# Patient Record
Sex: FEMALE | Race: BLACK OR AFRICAN AMERICAN | NOT HISPANIC OR LATINO | Employment: STUDENT | URBAN - METROPOLITAN AREA
[De-identification: names, ages, dates, MRNs, and addresses within clinical notes are randomized per-mention and may not be internally consistent; named-entity substitution may affect disease eponyms.]

---
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
## 2023-06-13 ENCOUNTER — HOSPITAL ENCOUNTER (EMERGENCY)
Facility: HOSPITAL | Age: 18
Discharge: HOME | End: 2023-06-14
Attending: EMERGENCY MEDICINE
Payer: MEDICAID

## 2023-06-13 VITALS
OXYGEN SATURATION: 98 % | DIASTOLIC BLOOD PRESSURE: 77 MMHG | BODY MASS INDEX: 25.99 KG/M2 | TEMPERATURE: 98 F | WEIGHT: 178.56 LBS | RESPIRATION RATE: 17 BRPM | HEART RATE: 67 BPM | SYSTOLIC BLOOD PRESSURE: 132 MMHG

## 2023-06-13 DIAGNOSIS — N89.8 VAGINAL DISCHARGE: Primary | ICD-10-CM

## 2023-06-13 DIAGNOSIS — Z20.2 POSSIBLE EXPOSURE TO STD: ICD-10-CM

## 2023-06-13 LAB
B-HCG UR QL: NEGATIVE
BILIRUBIN, POC UA: NEGATIVE
BLOOD, POC UA: NEGATIVE
CLARITY, POC UA: CLEAR
COLOR, POC UA: YELLOW
CTP QC/QA: YES
GLUCOSE, POC UA: NEGATIVE
KETONES, POC UA: ABNORMAL
LEUKOCYTE EST, POC UA: NEGATIVE
NITRITE, POC UA: NEGATIVE
PH UR STRIP: 5.5 [PH]
PROTEIN, POC UA: NEGATIVE
SPECIFIC GRAVITY, POC UA: >=1.03
UROBILINOGEN, POC UA: 0.2 E.U./DL

## 2023-06-13 PROCEDURE — 99282 EMERGENCY DEPT VISIT SF MDM: CPT | Mod: ER

## 2023-06-13 PROCEDURE — 81003 URINALYSIS AUTO W/O SCOPE: CPT | Mod: ER

## 2023-06-13 PROCEDURE — 81025 URINE PREGNANCY TEST: CPT | Mod: ER | Performed by: EMERGENCY MEDICINE

## 2023-06-14 PROCEDURE — 87591 N.GONORRHOEAE DNA AMP PROB: CPT | Performed by: EMERGENCY MEDICINE

## 2023-06-14 PROCEDURE — 81514 NFCT DS BV&VAGINITIS DNA ALG: CPT | Performed by: EMERGENCY MEDICINE

## 2023-06-14 NOTE — ED PROVIDER NOTES
"Encounter Date: 6/13/2023    SCRIBE #1 NOTE: I, Jacob Quesada, am scribing for, and in the presence of,  Anderson Pace MD. I have scribed the following portions of the note - Other sections scribed: HPI,ROS,PE.     History     Chief Complaint   Patient presents with    Vaginal Discharge     Discharge and burning for a couple of weeks.      Laith Sewell is a 18 y.o. female, with no pertinent PMHx, who presents to the ED with vaginal discharge and dysuria onset "a couple of weeks". Patient reports getting back together with her boyfriend who had an STD within the last 2 months. Patient states that her boyfriend received treatment for STD but states that her current symptoms worry her for STD's which is what brought her into the ED today. No other exacerbating or alleviating factors. Denies abdominal pain or other associated symptoms.  Patient has no other complaints at the present time.    The history is provided by the patient. No  was used.   Review of patient's allergies indicates:  No Known Allergies  No past medical history on file.  No past surgical history on file.  No family history on file.  Social History     Tobacco Use    Smoking status: Never    Smokeless tobacco: Never   Substance Use Topics    Alcohol use: Yes     Comment: OCC    Drug use: Yes     Types: Marijuana     Review of Systems   Constitutional: Negative.  Negative for fever.   HENT: Negative.  Negative for sore throat.    Eyes: Negative.    Respiratory: Negative.  Negative for shortness of breath.    Cardiovascular: Negative.  Negative for chest pain.   Gastrointestinal: Negative.  Negative for abdominal pain, nausea and vomiting.   Endocrine: Negative.    Genitourinary:  Positive for dysuria and vaginal discharge.   Musculoskeletal: Negative.  Negative for myalgias.   Skin: Negative.  Negative for rash.   Allergic/Immunologic: Negative.    Neurological: Negative.  Negative for headaches.   Hematological: " Negative.  Negative for adenopathy.   Psychiatric/Behavioral: Negative.  Negative for behavioral problems.    All other systems reviewed and are negative.    Physical Exam     Initial Vitals [06/13/23 2257]   BP Pulse Resp Temp SpO2   132/77 67 17 98.1 °F (36.7 °C) 98 %      MAP       --         Physical Exam    Nursing note and vitals reviewed.  Constitutional: She appears well-developed and well-nourished.   HENT:   Head: Normocephalic and atraumatic.   Right Ear: External ear normal.   Left Ear: External ear normal.   Nose: Nose normal.   Eyes: Conjunctivae are normal.   Neck: Neck supple.   Normal range of motion.  Cardiovascular:  Normal rate, regular rhythm, normal heart sounds, intact distal pulses and normal pulses.     Exam reveals no gallop and no friction rub.       No murmur heard.  Pulmonary/Chest: Effort normal and breath sounds normal. No respiratory distress.   Abdominal: Abdomen is soft and flat. There is no abdominal tenderness.   Genitourinary:    Genitourinary Comments:  exam deferred by patient.     Musculoskeletal:         General: Normal range of motion.      Cervical back: Normal range of motion and neck supple.     Neurological: She is alert and oriented to person, place, and time.   Skin: Skin is warm and dry. Capillary refill takes less than 2 seconds.   Psychiatric: She has a normal mood and affect. Her behavior is normal.       ED Course   Procedures  Labs Reviewed   POCT URINALYSIS W/O SCOPE - Abnormal; Notable for the following components:       Result Value    Ketones, UA 3+ (*)     Spec Grav UA >=1.030 (*)     All other components within normal limits   C. TRACHOMATIS/N. GONORRHOEAE BY AMP DNA   VAGINOSIS SCREEN BY DNA PROBE   POCT URINE PREGNANCY   POCT URINALYSIS W/O SCOPE          Imaging Results    None          Medications - No data to display  Medical Decision Making:   History:   Old Medical Records: I decided to obtain old medical records.  Clinical Tests:   Lab Tests:  Ordered and Reviewed  ED Management:  This patient presents with a vaginal discharge.  Patient deferred  evaluation/physical examination.  Patient has questionable exposure to her boyfriend with an STD.  There is some confusion as to her re exposure.  Patient had swabs obtained and urine obtained and sent off.  Patient again has no abdominal pain.  Patient has no barriers to care at home and will be notified appropriately.        Scribe Attestation:   Scribe #1: I performed the above scribed service and the documentation accurately describes the services I performed. I attest to the accuracy of the note.            This document was produced by a scribe under my direction and in my presence. I agree with the content of the note and have made any necessary edits.     Anderson Pace MD    06/14/2023 6:48 AM         Clinical Impression:   Final diagnoses:  [N89.8] Vaginal discharge (Primary)  [Z20.2] Possible exposure to STD        ED Disposition Condition    Discharge Stable          ED Prescriptions    None       Follow-up Information       Follow up With Specialties Details Why Contact Info    Petra Spaulding MD Pediatrics Schedule an appointment as soon as possible for a visit in 1 week  [address] [place] [address]  [phone]               Anderson Pace MD  06/14/23 0629

## 2023-06-15 LAB
C TRACH DNA SPEC QL NAA+PROBE: DETECTED
N GONORRHOEA DNA SPEC QL NAA+PROBE: NOT DETECTED

## 2023-06-16 LAB
BACTERIAL VAGINOSIS DNA: POSITIVE
CANDIDA GLABRATA DNA: NEGATIVE
CANDIDA KRUSEI DNA: NEGATIVE
CANDIDA RRNA VAG QL PROBE: NEGATIVE
T VAGINALIS RRNA GENITAL QL PROBE: NEGATIVE

## 2023-06-18 ENCOUNTER — TELEPHONE (OUTPATIENT)
Dept: EMERGENCY MEDICINE | Facility: HOSPITAL | Age: 18
End: 2023-06-18
Payer: MEDICAID

## 2023-06-18 RX ORDER — METRONIDAZOLE 500 MG/1
500 TABLET ORAL 2 TIMES DAILY
Qty: 14 TABLET | Refills: 0 | Status: SHIPPED | OUTPATIENT
Start: 2023-06-18 | End: 2023-06-25

## 2023-06-18 RX ORDER — DOXYCYCLINE 100 MG/1
100 CAPSULE ORAL 2 TIMES DAILY
Qty: 14 CAPSULE | Refills: 0 | Status: SHIPPED | OUTPATIENT
Start: 2023-06-18 | End: 2023-06-25

## 2023-07-23 ENCOUNTER — HOSPITAL ENCOUNTER (EMERGENCY)
Facility: HOSPITAL | Age: 18
Discharge: HOME | End: 2023-07-23
Attending: EMERGENCY MEDICINE
Payer: MEDICAID

## 2023-07-23 VITALS
OXYGEN SATURATION: 99 % | WEIGHT: 179.25 LBS | RESPIRATION RATE: 18 BRPM | DIASTOLIC BLOOD PRESSURE: 75 MMHG | TEMPERATURE: 98 F | SYSTOLIC BLOOD PRESSURE: 115 MMHG | HEIGHT: 70 IN | HEART RATE: 64 BPM | BODY MASS INDEX: 25.66 KG/M2

## 2023-07-23 DIAGNOSIS — L02.411 ABSCESS OF RIGHT AXILLA: Primary | ICD-10-CM

## 2023-07-23 LAB
B-HCG UR QL: NEGATIVE
CTP QC/QA: YES

## 2023-07-23 PROCEDURE — 99284 EMERGENCY DEPT VISIT MOD MDM: CPT | Mod: 25,ER

## 2023-07-23 PROCEDURE — 10060 I&D ABSCESS SIMPLE/SINGLE: CPT | Mod: ER

## 2023-07-23 PROCEDURE — 81025 URINE PREGNANCY TEST: CPT | Mod: ER

## 2023-07-23 PROCEDURE — 81025 URINE PREGNANCY TEST: CPT | Mod: ER | Performed by: EMERGENCY MEDICINE

## 2023-07-23 PROCEDURE — 25000003 PHARM REV CODE 250: Mod: ER | Performed by: NURSE PRACTITIONER

## 2023-07-23 RX ORDER — IBUPROFEN 600 MG/1
600 TABLET ORAL EVERY 6 HOURS PRN
Qty: 20 TABLET | Refills: 0 | Status: SHIPPED | OUTPATIENT
Start: 2023-07-23

## 2023-07-23 RX ORDER — CLINDAMYCIN HYDROCHLORIDE 150 MG/1
300 CAPSULE ORAL
Status: COMPLETED | OUTPATIENT
Start: 2023-07-23 | End: 2023-07-23

## 2023-07-23 RX ORDER — LIDOCAINE HYDROCHLORIDE 10 MG/ML
10 INJECTION INFILTRATION; PERINEURAL
Status: COMPLETED | OUTPATIENT
Start: 2023-07-23 | End: 2023-07-23

## 2023-07-23 RX ORDER — CLINDAMYCIN HYDROCHLORIDE 150 MG/1
300 CAPSULE ORAL 4 TIMES DAILY
Qty: 56 CAPSULE | Refills: 0 | Status: SHIPPED | OUTPATIENT
Start: 2023-07-23 | End: 2023-07-30

## 2023-07-23 RX ORDER — MUPIROCIN 20 MG/G
OINTMENT TOPICAL 3 TIMES DAILY
Qty: 15 G | Refills: 0 | Status: SHIPPED | OUTPATIENT
Start: 2023-07-23

## 2023-07-23 RX ORDER — IBUPROFEN 600 MG/1
600 TABLET ORAL
Status: COMPLETED | OUTPATIENT
Start: 2023-07-23 | End: 2023-07-23

## 2023-07-23 RX ADMIN — LIDOCAINE HYDROCHLORIDE 10 ML: 10 INJECTION, SOLUTION INFILTRATION; PERINEURAL at 10:07

## 2023-07-23 RX ADMIN — CLINDAMYCIN HYDROCHLORIDE 300 MG: 150 CAPSULE ORAL at 10:07

## 2023-07-23 RX ADMIN — IBUPROFEN 600 MG: 600 TABLET ORAL at 10:07

## 2023-07-23 NOTE — Clinical Note
"Laith Davies" Melodie was seen and treated in our emergency department on 7/23/2023.  She may return to work on 07/25/2023.       If you have any questions or concerns, please don't hesitate to call.      Linda Wahl NP"

## 2023-07-23 NOTE — ED PROVIDER NOTES
Encounter Date: 7/23/2023    SCRIBE #1 NOTE: I, Malorie Chacon, am scribing for, and in the presence of,  Linda Wahl NP. I have scribed the following portions of the note - Other sections scribed: HPI, ROS.     History     Chief Complaint   Patient presents with    Abscess     Right axillary abscess x3 days; hx of this happening prior per pt. Afebrile.      CC:  Abscess    HPI:  This is a 18-year-old female presenting to the ED for evaluation of right axillary abscess x3 days. Patient reports she has had an abscess before, but never this severe. NKDA    The history is provided by the patient. No  was used.   Review of patient's allergies indicates:  No Known Allergies  No past medical history on file.  No past surgical history on file.  No family history on file.  Social History     Tobacco Use    Smoking status: Never    Smokeless tobacco: Never   Substance Use Topics    Alcohol use: Yes     Comment: OCC    Drug use: Yes     Types: Marijuana     Review of Systems   Constitutional:  Negative for chills and fever.   HENT:  Negative for sore throat.    Respiratory:  Negative for shortness of breath.    Cardiovascular:  Negative for chest pain.   Gastrointestinal:  Negative for nausea.   Genitourinary:  Negative for dysuria.   Musculoskeletal:  Negative for back pain.   Skin:  Negative for rash.        abscess   Neurological:  Negative for weakness.   Hematological:  Does not bruise/bleed easily.     Physical Exam     Initial Vitals [07/23/23 0940]   BP Pulse Resp Temp SpO2   115/75 64 18 98.4 °F (36.9 °C) 99 %      MAP       --         Physical Exam    Constitutional: She appears well-developed and well-nourished. She is not diaphoretic. No distress.   HENT:   Head: Normocephalic and atraumatic.   Neck:   Normal range of motion.  Cardiovascular:  Normal rate, regular rhythm, normal heart sounds and intact distal pulses.           Pulmonary/Chest: No respiratory distress.    Musculoskeletal:         General: Normal range of motion.      Cervical back: Normal range of motion.     Neurological: She is alert and oriented to person, place, and time.   Skin: Skin is warm and dry. Abscess noted.   3 cm fluctuance to right axilla with overlying erythema   Psychiatric: She has a normal mood and affect. Her behavior is normal.       ED Course   I & D - Incision and Drainage    Date/Time: 7/23/2023 11:15 AM  Location procedure was performed: [facility] EMERGENCY DEPARTMENT  Performed by: Linda Wahl NP  Authorized by: Lorenza Easley DO   Type: abscess  Location: right axilla.  Anesthesia: local infiltration    Anesthesia:  Local Anesthetic: lidocaine 1% without epinephrine  Anesthetic total: 6 mL  Scalpel size: 10  Incision type: single straight  Complexity: simple  Drainage: pus, bloody and purulent  Drainage amount: copious  Wound treatment: incision, drainage, expression of material, deloculation and wound packed  Packing material: 1/4 in gauze  Patient tolerance: Patient tolerated the procedure well with no immediate complications      Labs Reviewed   POCT URINE PREGNANCY          Imaging Results    None          Medications   LIDOcaine HCL 10 mg/ml (1%) injection 10 mL (10 mLs Infiltration Given 7/23/23 1042)   clindamycin capsule 300 mg (300 mg Oral Given 7/23/23 1042)   ibuprofen tablet 600 mg (600 mg Oral Given 7/23/23 1042)     Medical Decision Making:   History:   Old Medical Records: I decided to obtain old medical records.  Clinical Tests:   Lab Tests: Ordered and Reviewed  ED Management:  This is an evaluation of a 18 y.o. female that presents to the Emergency Department for an abscess. Physical Exam shows a non-toxic, afebrile, and well appearing female. There is a 3 cm x 3 cm abscess to the right axilla with a central area of fluctuance with surrounding induration and erythema.  There is not active drainage. FROM right shoulder.    Vital Signs Are Reassuring.  Procedure: Abscess was drained per the procedure note. The wound was packed.      My overall impression is Abscess. I considered, but at this time, do not suspect an extensive cellulitis, sepsis, or bacteremia to warrant admission.    D/C Information: warm compresses 10-15 minutes, 4-5 times a day to help increase wound drainage and decrease swelling. The diagnosis, treatment plan, instructions for follow-up and reevaluation with her PCP or the ED in 2 - 3 days for wound recheck as well as ED return precautions were discussed and understanding was verbalized. All questions or concerns have been addressed.         Scribe Attestation:   Scribe #1: I performed the above scribed service and the documentation accurately describes the services I performed. I attest to the accuracy of the note.                 I, CARLOS Wahl, personally performed the services described in this documentation.  All medical record entries made by the scribe were at my direction and in my presence.  I have reviewed the chart and agree that the record reflects my personal performance and is accurate and complete.   Clinical Impression:   Final diagnoses:  [L02.411] Abscess of right axilla (Primary)        ED Disposition Condition    Discharge Stable          ED Prescriptions       Medication Sig Dispense Start Date End Date Auth. Provider    clindamycin (CLEOCIN) 150 MG capsule Take 2 capsules (300 mg total) by mouth 4 (four) times daily. for 7 days 56 capsule 7/23/2023 7/30/2023 Linda Wahl NP    mupirocin (BACTROBAN) 2 % ointment Apply topically 3 (three) times daily. 15 g 7/23/2023 -- Linda Wahl NP    ibuprofen (ADVIL,MOTRIN) 600 MG tablet Take 1 tablet (600 mg total) by mouth every 6 (six) hours as needed for Pain. 20 tablet 7/23/2023 -- Linda Wahl NP          Follow-up Information    None          Linda Wahl NP  07/23/23 2437

## 2024-01-04 ENCOUNTER — HOSPITAL ENCOUNTER (EMERGENCY)
Facility: HOSPITAL | Age: 19
Discharge: HOME | End: 2024-01-04
Attending: EMERGENCY MEDICINE
Payer: MEDICAID

## 2024-01-04 VITALS
OXYGEN SATURATION: 100 % | HEART RATE: 71 BPM | SYSTOLIC BLOOD PRESSURE: 112 MMHG | HEIGHT: 70 IN | WEIGHT: 170 LBS | TEMPERATURE: 99 F | DIASTOLIC BLOOD PRESSURE: 71 MMHG | BODY MASS INDEX: 24.34 KG/M2 | RESPIRATION RATE: 14 BRPM

## 2024-01-04 DIAGNOSIS — J06.9 UPPER RESPIRATORY TRACT INFECTION, UNSPECIFIED TYPE: Primary | ICD-10-CM

## 2024-01-04 LAB
B-HCG UR QL: NEGATIVE
CTP QC/QA: YES
CTP QC/QA: YES
INFLUENZA A ANTIGEN, POC: NEGATIVE
INFLUENZA B ANTIGEN, POC: NEGATIVE
SARS-COV-2 RDRP RESP QL NAA+PROBE: NEGATIVE

## 2024-01-04 PROCEDURE — 87804 INFLUENZA ASSAY W/OPTIC: CPT | Mod: ER

## 2024-01-04 PROCEDURE — 99284 EMERGENCY DEPT VISIT MOD MDM: CPT | Mod: ER

## 2024-01-04 PROCEDURE — 87635 SARS-COV-2 COVID-19 AMP PRB: CPT | Mod: ER | Performed by: NURSE PRACTITIONER

## 2024-01-04 PROCEDURE — 81025 URINE PREGNANCY TEST: CPT | Mod: ER | Performed by: EMERGENCY MEDICINE

## 2024-01-04 RX ORDER — ACETAMINOPHEN 500 MG
1000 TABLET ORAL EVERY 6 HOURS PRN
Qty: 20 TABLET | Refills: 0 | Status: SHIPPED | OUTPATIENT
Start: 2024-01-04

## 2024-01-04 RX ORDER — FLUTICASONE PROPIONATE 50 MCG
1 SPRAY, SUSPENSION (ML) NASAL 2 TIMES DAILY PRN
Qty: 15 G | Refills: 0 | Status: SHIPPED | OUTPATIENT
Start: 2024-01-04

## 2024-01-04 RX ORDER — GUAIFENESIN 600 MG/1
1200 TABLET, EXTENDED RELEASE ORAL 2 TIMES DAILY PRN
Qty: 40 TABLET | Refills: 0 | Status: SHIPPED | OUTPATIENT
Start: 2024-01-04 | End: 2024-01-14

## 2024-01-04 RX ORDER — CETIRIZINE HYDROCHLORIDE 10 MG/1
10 TABLET ORAL DAILY
Qty: 30 TABLET | Refills: 0 | Status: SHIPPED | OUTPATIENT
Start: 2024-01-04 | End: 2025-01-03

## 2024-01-04 NOTE — DISCHARGE INSTRUCTIONS
COVID and FLU are negative.    Thank you for coming to our Emergency Department today. It is important to remember that some problems or medical conditions are difficult to diagnose and may not be found during your Emergency Department visit.     Be sure to follow up with your primary care doctor and review all labs/imaging/tests that were performed during your ER visit with them. Some labs/tests may be outside of the normal range and require non-emergent follow-up and further investigation to help diagnose/exclude/prevent complications or other potentially serious medical conditions that were not addressed during your ER visit.    If you do not have a primary care doctor, you may contact the one listed on your discharge paperwork or you may also call the [facility] Appointment Desk at [phone] to schedule an appointment and establish care with one. It is important to your health that you have a primary care doctor.    Please take all medications as directed. All medications may potentially have side-effects and it is impossible to predict which medications may give you side-effects or what side-effects (if any) they will give you.. If you feel that you are having a negative effect or side-effect of any medication you should immediately stop taking them and seek medical attention. If you feel that you are having a life-threatening reaction call 911.    Return to the ER with any questions/concerns, new/concerning symptoms, worsening or failure to improve.     Do not drive, swim, climb to height, take a bath, operate heavy machinery, drink alcohol or take potentially sedating medications, sign any legal documents or make any important decisions for 24 hours if you have received any pain medications, sedatives or mood altering drugs during your ER visit or within 24 hours of taking them if they have been prescribed to you.     You can find additional resources for Dentists, hearing aids, durable medical  equipment, low cost pharmacies and other resources at [email or web address]    BELOW THIS LINE ONLY APPLIES IF YOU HAVE A COVID TEST PENDING OR IF YOU HAVE BEEN DIAGNOSED WITH COVID:  Please access [facility] to review the results of your test. Until the results of your COVID test return, you should isolate yourself so as not to potentially spread illness to others.   If your COVID test returns positive, you should isolate yourself so as not to spread illness to others. After five full days, if you are feeling better and you have not had fever for 24 hours, you can return to your typical daily activities, but you must wear a mask around others for an additional 5 days.   If your COVID test returns negative and you are either unvaccinated or more than six months out from your two-dose vaccine and are not yet boosted, you should still quarantine for 5 full days followed by strict mask use for an additional 5 full days.   If your COVID test returns negative and you have received your 2-dose initial vaccine as well as a booster, you should continue strict mask use for 10 full days after the exposure.  For all those exposed, best practice includes a test at day 5 after the exposure. This can be a home test or a test through one of the many testing centers throughout our community.   Masking is always advised to limit the spread of COVID. Cdc.gov is an excellent site to obtain the latest up to date recommendations regarding COVID and COVID testing.     CDC Testing and Quarantine Guidelines for patients with exposure to a known-positive COVID-19 person:  A close exposure is defined as anyone who has had an exposure (masked or unmasked) to a known COVID -19 positive person within 6 feet of someone for a cumulative total of 15 minutes or more over a 24-hour period.   Vaccinated and/or if you recently had a positive covid test within 90 days do NOT need to quarantine after contact with someone who had COVID-19 unless you  develop symptoms.   Fully vaccinated people who have not had a positive test within 90 days, should get tested 3-5 days after their exposure, even if they don't have symptoms and wear a mask indoors in public for 14 days following exposure or until their test result is negative.      Unvaccinated and/or NOT had a positive test within 90 days and meet close exposure  You are required by CDC guidelines to quarantine for at least 5 days from time of exposure followed by 5 days of strict masking. It is recommended, but not required to test after 5 days, unless you develop symptoms, in which case you should test at that time.  If you get tested after 5 days and your test is positive, your 5 day period of isolation starts the day of the positive test.    If your exposure does not meet the above definition, you can return to your normal daily activities to include social distancing, wearing a mask and frequent handwashing.      Here is a link to guidance from the CDC:  https://www.cdc.gov/media/releases/2021/s1227-isolation-quarantine-guidance.html      [place] Dept Of Health Testing Sites:  https://ldh.la.gov/page/3934      [facility] website with testing locations and guidance:  [email or web address]

## 2024-01-04 NOTE — ED PROVIDER NOTES
Encounter Date: 1/4/2024       History     Chief Complaint   Patient presents with    Possible Pregnancy    COVID-19 Concerns     A 17 y/o female presents to the ER with pregnancy and Covid concerns requesting testing. VSS and Afebrile. Pt also reporting a cough x 2 weeks.      CC: URI    HPI:  This is a 18-year-old female presenting to the ED for evaluation of headache, congestion, loss of taste and smell.  Symptoms began 4 days ago.  She reports a persistent cough for the past few weeks.  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  She is also requesting a pregnancy test.  LMP mid December.    The history is provided by the patient. No  was used.     Review of patient's allergies indicates:  No Known Allergies  No past medical history on file.  No past surgical history on file.  No family history on file.  Social History     Tobacco Use    Smoking status: Never    Smokeless tobacco: Never   Substance Use Topics    Alcohol use: Yes     Comment: OCC    Drug use: Yes     Types: Marijuana     Review of Systems   Constitutional:  Negative for chills and fever.   HENT:  Positive for congestion. Negative for sore throat and voice change.    Respiratory:  Positive for cough. Negative for shortness of breath.    Cardiovascular:  Negative for chest pain.   Gastrointestinal:  Negative for nausea.   Genitourinary:  Negative for dysuria.   Musculoskeletal:  Negative for back pain.   Skin:  Negative for rash.   Neurological:  Negative for weakness.   Hematological:  Does not bruise/bleed easily.       Physical Exam     Initial Vitals [01/04/24 1449]   BP Pulse Resp Temp SpO2   106/69 78 18 98.7 °F (37.1 °C) 100 %      MAP       --         Physical Exam    Constitutional: She appears well-developed and well-nourished. She is not diaphoretic. No distress.   HENT:   Head: Normocephalic and atraumatic.   Neck:   Normal range of motion.  Cardiovascular:  Normal rate, regular rhythm, normal  heart sounds and intact distal pulses.           Pulmonary/Chest: No respiratory distress.   Musculoskeletal:         General: Normal range of motion.      Cervical back: Normal range of motion.     Neurological: She is alert and oriented to person, place, and time.   Skin: Skin is warm and dry.   Psychiatric: She has a normal mood and affect. Her behavior is normal.         ED Course   Procedures  Labs Reviewed   POCT URINE PREGNANCY   SARS-COV-2 RDRP GENE    Narrative:     This test utilizes isothermal nucleic acid amplification technology to detect the SARS-CoV-2 RdRp nucleic acid segment. The analytical sensitivity (limit of detection) is 500 copies/swab.     A POSITIVE result is indicative of the presence of SARS-CoV-2 RNA; clinical correlation with patient history and other diagnostic information is necessary to determine patient infection status.    A NEGATIVE result means that SARS-CoV-2 nucleic acids are not present above the limit of detection. A NEGATIVE result should be treated as presumptive. It does not rule out the possibility of COVID-19 and should not be the sole basis for treatment decisions. If COVID-19 is strongly suspected based on clinical and exposure history, re-testing using an alternate molecular assay should be considered.     This test is only for use under the Food and Drug Administration s Emergency Use Authorization (EUA).     Commercial kits are provided by [employer]. Performance characteristics of the EUA have been independently verified by [facility] Department of Pathology and Laboratory Medicine.   _________________________________________________________________   The authorized Fact Sheet for Healthcare Providers and the authorized Fact Sheet for Patients of the ID NOW COVID-19 are available on the FDA website:    https://www.fda.gov/media/864346/download      https://www.fda.gov/media/413024/download      POCT INFLUENZA A/B MOLECULAR   POCT RAPID INFLUENZA  A/B          Imaging Results    None          Medications - No data to display  Medical Decision Making  This is an evaluation of a 18 y.o. female that presents to the Emergency Department for URI symptoms. The patient is a non-toxic, afebrile, and well appearing female. On physical exam ears and pharynx are without evidence of infection. Appears well hydrated with moist mucus membranes. Neck soft and supple with no meningeal signs or cervical lymphadenopathy. Breath sounds are clear and equal bilaterally with no adventitious breath sounds, tachypnea or respiratory distress with room air pulse ox of 100% and no evidence of hypoxia.     Vital Signs Are Reassuring.  RESULTS:   UPT negative.  COVID and flu negative.    My overall impression is Viral URI. I considered, but at this time, do not suspect OM, OE, strep pharyngitis, meningitis, pneumonia, or acute bacterial sinusitis.    ED return precautions were discussed and understanding was verbalized. All questions or concerns have been addressed.     Amount and/or Complexity of Data Reviewed  Labs: ordered.    Risk  OTC drugs.                                      Clinical Impression:  Final diagnoses:  [J06.9] Upper respiratory tract infection, unspecified type (Primary)          ED Disposition Condition    Discharge Stable          ED Prescriptions       Medication Sig Dispense Start Date End Date Auth. Provider    guaiFENesin (MUCINEX) 600 mg 12 hr tablet Take 2 tablets (1,200 mg total) by mouth 2 (two) times daily as needed for Congestion. 40 tablet 1/4/2024 1/14/2024 Linda Wahl NP    fluticasone propionate (FLONASE) 50 mcg/actuation nasal spray 1 spray (50 mcg total) by Each Nostril route 2 (two) times daily as needed for Rhinitis or Allergies. 15 g 1/4/2024 -- Linda Wahl NP    cetirizine (ZYRTEC) 10 MG tablet Take 1 tablet (10 mg total) by mouth once daily. 30 tablet 1/4/2024 1/3/2025 Linda Wahl NP    acetaminophen (TYLENOL) 500 MG tablet  Take 2 tablets (1,000 mg total) by mouth every 6 (six) hours as needed for Pain. 20 tablet 1/4/2024 -- Linda Wahl NP          Follow-up Information       Follow up With Specialties Details Why Contact Info    Petra Spaulding MD Pediatrics Schedule an appointment as soon as possible for a visit  For follow-up [address] [address]  [phone]      [facility] ED Emergency Medicine Go to  If symptoms worsen [address] [facility] [address] [phone]             Linda Wahl NP  01/04/24 5369

## 2024-08-24 ENCOUNTER — HOSPITAL ENCOUNTER (EMERGENCY)
Facility: HOSPITAL | Age: 19
Discharge: HOME | End: 2024-08-24
Attending: EMERGENCY MEDICINE
Payer: MEDICAID

## 2024-08-24 VITALS
HEART RATE: 69 BPM | RESPIRATION RATE: 19 BRPM | BODY MASS INDEX: 25.4 KG/M2 | DIASTOLIC BLOOD PRESSURE: 68 MMHG | OXYGEN SATURATION: 100 % | SYSTOLIC BLOOD PRESSURE: 124 MMHG | HEIGHT: 69 IN | WEIGHT: 171.5 LBS | TEMPERATURE: 98 F

## 2024-08-24 DIAGNOSIS — N30.00 ACUTE CYSTITIS WITHOUT HEMATURIA: Primary | ICD-10-CM

## 2024-08-24 LAB
B-HCG UR QL: NEGATIVE
BILIRUBIN, POC UA: NEGATIVE
BLOOD, POC UA: NEGATIVE
CLARITY, UA POC: CLEAR
COLOR, UA POC: YELLOW
CTP QC/QA: YES
GLUCOSE, POC UA: NEGATIVE
KETONES, POC UA: NEGATIVE
LEUKOCYTE EST, POC UA: ABNORMAL
NITRITE, POC UA: POSITIVE
PH UR STRIP: 6 [PH]
PROTEIN, POC UA: NEGATIVE
SPECIFIC GRAVITY, POC UA: 1.02
UROBILINOGEN, POC UA: 0.2 E.U./DL

## 2024-08-24 PROCEDURE — 81025 URINE PREGNANCY TEST: CPT | Mod: ER

## 2024-08-24 PROCEDURE — 81514 NFCT DS BV&VAGINITIS DNA ALG: CPT

## 2024-08-24 PROCEDURE — 87491 CHLMYD TRACH DNA AMP PROBE: CPT

## 2024-08-24 PROCEDURE — 87591 N.GONORRHOEAE DNA AMP PROB: CPT

## 2024-08-24 PROCEDURE — 99283 EMERGENCY DEPT VISIT LOW MDM: CPT | Mod: 25,ER

## 2024-08-24 RX ORDER — NITROFURANTOIN 25; 75 MG/1; MG/1
100 CAPSULE ORAL 2 TIMES DAILY
Qty: 10 CAPSULE | Refills: 0 | Status: SHIPPED | OUTPATIENT
Start: 2024-08-24 | End: 2024-08-29

## 2024-08-24 NOTE — DISCHARGE INSTRUCTIONS
You were seen in the emergency department today for UTI.  Please take all medications as prescribed and as we discussed.  Follow-up with specialist if instructed to do so.  It is important to remember that some problems are difficult to diagnose and may not be found during your Emergency Department visit. Be sure to follow up with your primary care doctor and review all labs/imaging/tests that were performed during this visit with them. Some labs/tests may be outside of the normal range and require non-emergent follow-up and further investigation to help diagnose/exclude/prevent complications or other medical conditions. Return to the emergency department for any new or worsening symptoms. Thank you for allowing me to care for you today, it was my pleasure. I hope you get to feeling better soon!

## 2024-08-24 NOTE — ED PROVIDER NOTES
"Encounter Date: 8/24/2024       History     Chief Complaint   Patient presents with    Exposure to STD     Requesting to be "checked out." Denies vaginal discharge, denies dysuria     Patient is a 19-year-old female with no past medical history presents to the emergency department for STI testing.  She states she is currently trying to conceive, and is wanting to make sure she does not have anything that could be causing difficulty with becoming pregnant.  She denies dysuria, urinary frequency, hematuria, flank pain.  Denies vaginal bleeding, vaginal discharge.  Denies abdominal pain.  No fever, vomiting.  No symptoms.  She is also requesting assistance with a obtaining an OBGYN.    The history is provided by the patient.     Review of patient's allergies indicates:  No Known Allergies  History reviewed. No pertinent past medical history.  History reviewed. No pertinent surgical history.  No family history on file.  Social History     Tobacco Use    Smoking status: Never    Smokeless tobacco: Never   Substance Use Topics    Alcohol use: Yes     Comment: OCC    Drug use: Yes     Types: Marijuana     Review of Systems   Constitutional:  Negative for chills and fever.   Respiratory:  Negative for shortness of breath.    Cardiovascular:  Negative for chest pain.   Gastrointestinal:  Negative for abdominal pain, nausea and vomiting.   Genitourinary:  Negative for dysuria, flank pain, frequency, hematuria, menstrual problem, pelvic pain, vaginal bleeding and vaginal discharge.   Musculoskeletal:  Negative for back pain, neck pain and neck stiffness.   Neurological:  Negative for headaches.       Physical Exam     Initial Vitals [08/24/24 1702]   BP Pulse Resp Temp SpO2   120/68 67 18 98.1 °F (36.7 °C) 100 %      MAP       --         Physical Exam    Nursing note and vitals reviewed.  Constitutional: She appears well-developed and well-nourished.   HENT:   Head: Normocephalic and atraumatic.   Right Ear: External ear " normal.   Left Ear: External ear normal.   Neck: Carotid bruit is not present.   Normal range of motion.  Cardiovascular:  Normal rate, regular rhythm, normal heart sounds and intact distal pulses.     Exam reveals no gallop and no friction rub.       No murmur heard.  Pulmonary/Chest: Breath sounds normal. No respiratory distress. She has no wheezes. She has no rhonchi. She has no rales.   Abdominal: Abdomen is soft. Bowel sounds are normal. She exhibits no distension. There is no abdominal tenderness. There is no rebound and no guarding.   Musculoskeletal:         General: Normal range of motion.      Cervical back: Normal range of motion.     Neurological: She is alert and oriented to person, place, and time. GCS score is 15. GCS eye subscore is 4. GCS verbal subscore is 5. GCS motor subscore is 6.   Psychiatric: She has a normal mood and affect.         ED Course   Procedures  Labs Reviewed   POCT URINALYSIS W/O SCOPE - Abnormal       Result Value    Glucose, UA Negative      Bilirubin, UA Negative      Ketones, UA Negative      Spec Grav UA 1.020      Blood, UA Negative      PH, UA 6.0      Protein, UA Negative      Urobilinogen, UA 0.2      Nitrite, UA Positive (*)     Leukocytes, UA Trace (*)     Color, UA POC Yellow      Clarity, UA, POC Clear     C. TRACHOMATIS/N. GONORRHOEAE BY AMP DNA   VAGINOSIS SCREEN BY DNA PROBE   POCT URINE PREGNANCY    POC Preg Test, Ur Negative       Acceptable Yes     POCT URINALYSIS W/O SCOPE          Imaging Results    None          Medications - No data to display  Medical Decision Making  This is an emergent evaluation of a 19-year-old female who presents for STI testing.  No symptoms.    Patient looks well clinically. Regular rate rhythm without murmurs.  No carotid bruits appreciated on exam. Lungs are clear to auscultation bilaterally.  Abdomen is soft, nontender, non distended, with normal bowel sounds.     Differential diagnosis includes but is not limited  to UTI, gonorrhea, chlamydia.  Considered but doubt PID/TOA.    Workup initiated with urinalysis, UPT, vaginal screen, GC.  Vital signs, chart, labs, and/or imaging were all reviewed.  See ED course below and interpretations above.  Will await test results prior to treating for GC given no symptoms.  Will discharge home with Macrobid for UTI, OBGYN follow-up.  Ambulatory referral placed. Patient is very well appearing, and in no acute distress. Vital signs are reassuring here in the emergency department, patient is afebrile, breathing comfortable, satting 100 % on room air. Patient/Caregiver is stable for discharge at this time.  Patient/Caregiver was informed of results and plan of care. Patient/Caregiver verbalized understanding of care plan. All questions and concerns were addressed. Discussed strict return precautions with the patient/caregiver. Instructed follow up with primary care provider within 1 week.      Darryl Hughes PA-C    DISCLAIMER: This note was prepared with [employer] voice recognition transcription software. Garbled syntax, mangled pronouns, and other bizarre constructions may be attributed to that software system.       Amount and/or Complexity of Data Reviewed  Labs: ordered. Decision-making details documented in ED Course.    Risk  Prescription drug management.               ED Course as of 08/24/24 2046   Sat Aug 24, 2024   1732 POCT URINALYSIS W/O SCOPE(!)  Urinalysis showing trace leukocytes, positive nitrite. No blood. [TM]   1754 POCT urine pregnancy  UPT negative [TM]   1754 Will discharge home with Macrobid for UTI [TM]   1754 We will hold off on empiric treatment for STIs [TM]      ED Course User Index  [TM] Darryl Hughes PA-C                           Clinical Impression:  Final diagnoses:  [N30.00] Acute cystitis without hematuria (Primary)          ED Disposition Condition    Discharge Stable          ED Prescriptions       Medication Sig Dispense Start Date End Date Auth.  Provider    nitrofurantoin, macrocrystal-monohydrate, (MACROBID) 100 MG capsule Take 1 capsule (100 mg total) by mouth 2 (two) times daily. for 5 days 10 capsule 8/24/2024 8/29/2024 Darryl Hughes PA-C          Follow-up Information       Follow up With Specialties Details Why Contact Info    [facility] ED Emergency Medicine Go to  As needed, If symptoms worsen, or new symptoms develop [address] [facility] [address] [phone]    Primary care doctor  Schedule an appointment as soon as possible for a visit in 3 days               Darryl Hughes PA-C  08/24/24 3574

## 2024-08-26 LAB
C TRACH DNA SPEC QL NAA+PROBE: NOT DETECTED
N GONORRHOEA DNA SPEC QL NAA+PROBE: NOT DETECTED

## 2024-08-27 LAB
BACTERIAL VAGINOSIS DNA: NORMAL
CANDIDA GLABRATA DNA: NORMAL
CANDIDA KRUSEI DNA: NORMAL
CANDIDA RRNA VAG QL PROBE: NORMAL
T VAGINALIS RRNA GENITAL QL PROBE: NORMAL

## 2024-12-20 ENCOUNTER — HOSPITAL ENCOUNTER (EMERGENCY)
Facility: HOSPITAL | Age: 19
Discharge: HOME | End: 2024-12-20
Attending: STUDENT IN AN ORGANIZED HEALTH CARE EDUCATION/TRAINING PROGRAM
Payer: MEDICAID

## 2024-12-20 VITALS
DIASTOLIC BLOOD PRESSURE: 79 MMHG | BODY MASS INDEX: 25.84 KG/M2 | RESPIRATION RATE: 18 BRPM | OXYGEN SATURATION: 99 % | SYSTOLIC BLOOD PRESSURE: 148 MMHG | TEMPERATURE: 98 F | HEART RATE: 74 BPM | WEIGHT: 175 LBS

## 2024-12-20 DIAGNOSIS — N39.0 URINARY TRACT INFECTION WITH HEMATURIA, SITE UNSPECIFIED: ICD-10-CM

## 2024-12-20 DIAGNOSIS — J10.1 INFLUENZA A: Primary | ICD-10-CM

## 2024-12-20 DIAGNOSIS — R31.9 URINARY TRACT INFECTION WITH HEMATURIA, SITE UNSPECIFIED: ICD-10-CM

## 2024-12-20 LAB
B-HCG UR QL: NEGATIVE
BILIRUBIN, POC UA: NEGATIVE
BLOOD, POC UA: ABNORMAL
CLARITY, UA: CLEAR
COLOR, UA: YELLOW
CTP QC/QA: YES
CTP QC/QA: YES
GLUCOSE, POC UA: NEGATIVE
INFLUENZA A ANTIGEN, POC: POSITIVE
INFLUENZA B ANTIGEN, POC: NEGATIVE
KETONES, POC UA: ABNORMAL
LEUKOCYTE EST, POC UA: ABNORMAL
NITRITE, POC UA: POSITIVE
PH UR STRIP: 6 [PH] (ref 5–8)
POC RAPID STREP A: NEGATIVE
PROTEIN, POC UA: ABNORMAL
SARS-COV-2 RDRP RESP QL NAA+PROBE: NEGATIVE
SPECIFIC GRAVITY, POC UA: 1.02 (ref 1–1.03)
UROBILINOGEN, POC UA: 0.2 E.U./DL

## 2024-12-20 PROCEDURE — 87088 URINE BACTERIA CULTURE: CPT | Performed by: STUDENT IN AN ORGANIZED HEALTH CARE EDUCATION/TRAINING PROGRAM

## 2024-12-20 PROCEDURE — 87804 INFLUENZA ASSAY W/OPTIC: CPT | Mod: 59,ER

## 2024-12-20 PROCEDURE — 99284 EMERGENCY DEPT VISIT MOD MDM: CPT | Mod: 25,ER

## 2024-12-20 PROCEDURE — 63600175 PHARM REV CODE 636 W HCPCS: Mod: ER | Performed by: STUDENT IN AN ORGANIZED HEALTH CARE EDUCATION/TRAINING PROGRAM

## 2024-12-20 PROCEDURE — 87635 SARS-COV-2 COVID-19 AMP PRB: CPT | Mod: ER | Performed by: STUDENT IN AN ORGANIZED HEALTH CARE EDUCATION/TRAINING PROGRAM

## 2024-12-20 PROCEDURE — 87880 STREP A ASSAY W/OPTIC: CPT | Mod: ER

## 2024-12-20 PROCEDURE — 81025 URINE PREGNANCY TEST: CPT | Mod: ER | Performed by: STUDENT IN AN ORGANIZED HEALTH CARE EDUCATION/TRAINING PROGRAM

## 2024-12-20 PROCEDURE — 96372 THER/PROPH/DIAG INJ SC/IM: CPT | Performed by: STUDENT IN AN ORGANIZED HEALTH CARE EDUCATION/TRAINING PROGRAM

## 2024-12-20 PROCEDURE — 25000003 PHARM REV CODE 250: Mod: ER | Performed by: STUDENT IN AN ORGANIZED HEALTH CARE EDUCATION/TRAINING PROGRAM

## 2024-12-20 PROCEDURE — 87086 URINE CULTURE/COLONY COUNT: CPT | Performed by: STUDENT IN AN ORGANIZED HEALTH CARE EDUCATION/TRAINING PROGRAM

## 2024-12-20 PROCEDURE — 87186 SC STD MICRODIL/AGAR DIL: CPT | Performed by: STUDENT IN AN ORGANIZED HEALTH CARE EDUCATION/TRAINING PROGRAM

## 2024-12-20 RX ORDER — OSELTAMIVIR PHOSPHATE 75 MG/1
75 CAPSULE ORAL 2 TIMES DAILY
Qty: 10 CAPSULE | Refills: 0 | Status: SHIPPED | OUTPATIENT
Start: 2024-12-20 | End: 2024-12-25

## 2024-12-20 RX ORDER — KETOROLAC TROMETHAMINE 30 MG/ML
15 INJECTION, SOLUTION INTRAMUSCULAR; INTRAVENOUS
Status: COMPLETED | OUTPATIENT
Start: 2024-12-20 | End: 2024-12-20

## 2024-12-20 RX ORDER — IBUPROFEN 600 MG/1
600 TABLET ORAL EVERY 6 HOURS PRN
Qty: 20 TABLET | Refills: 0 | Status: SHIPPED | OUTPATIENT
Start: 2024-12-20

## 2024-12-20 RX ORDER — NITROFURANTOIN 25; 75 MG/1; MG/1
100 CAPSULE ORAL 2 TIMES DAILY
Qty: 14 CAPSULE | Refills: 0 | Status: SHIPPED | OUTPATIENT
Start: 2024-12-20 | End: 2024-12-27

## 2024-12-20 RX ORDER — ACETAMINOPHEN 325 MG/1
650 TABLET ORAL
Status: COMPLETED | OUTPATIENT
Start: 2024-12-20 | End: 2024-12-20

## 2024-12-20 RX ADMIN — KETOROLAC TROMETHAMINE 15 MG: 30 INJECTION, SOLUTION INTRAMUSCULAR; INTRAVENOUS at 08:12

## 2024-12-20 RX ADMIN — ACETAMINOPHEN 650 MG: 325 TABLET ORAL at 08:12

## 2024-12-20 NOTE — ED PROVIDER NOTES
"Encounter Date: 12/20/2024    SCRIBE #1 NOTE: I, Valentina Nadya, am scribing for, and in the presence of,  Jael Quispe DO. I have scribed the following portions of the note - Other sections scribed: HPI, ROS.       History     Chief Complaint   Patient presents with    General Illness     Reports fatigue, cough x 3 days. "I am just not feeling well."  Afebrile.      HPI: 19 year old female, with no PMHx, presents to the ED for evaluation of dry cough, symptoms onset x 3 days. Reports associated x 1 vomiting, chills, sore throat, decreased appetite, sinus pain, headache, 4/10 lower back pain. No other alleviating or exacerbating factors. Endorses Marijuana use. Denies ETOH or tobacco use. Denies dysuria, hematuria, diarrhea, chest pain, SOB, fever, abdominal pain, ear pain, ear pressure, or other associated symptoms. Patient attempted Advil as treatment/medication without relief yesterday. This is the extent of the patient's complaints in the ED.     The history is provided by the patient. No  was used.     Review of patient's allergies indicates:  No Known Allergies  History reviewed. No pertinent past medical history.  History reviewed. No pertinent surgical history.  No family history on file.  Social History     Tobacco Use    Smoking status: Never    Smokeless tobacco: Never   Substance Use Topics    Alcohol use: Yes     Comment: OCC    Drug use: Yes     Types: Marijuana     Review of Systems   Constitutional:  Positive for appetite change and chills. Negative for fever.   HENT:  Positive for sinus pain and sore throat. Negative for congestion, ear pain and rhinorrhea.         (-) ear pressure     Eyes:  Negative for visual disturbance.   Respiratory:  Positive for cough. Negative for shortness of breath.    Cardiovascular:  Negative for chest pain.   Gastrointestinal:  Positive for vomiting. Negative for abdominal pain, diarrhea and nausea.   Genitourinary:  Negative for " dysuria, frequency and hematuria.   Musculoskeletal:  Positive for back pain (lower). Negative for neck pain and neck stiffness.   Skin:  Negative for rash.   Neurological:  Positive for headaches. Negative for dizziness and weakness.   Psychiatric/Behavioral:  Negative for confusion.        Physical Exam     Initial Vitals [12/20/24 0706]   BP Pulse Resp Temp SpO2   105/70 78 14 98.6 °F (37 °C) 98 %      MAP       --         Physical Exam    Nursing note and vitals reviewed.  Constitutional: She appears well-developed and well-nourished. She is not diaphoretic.  Non-toxic appearance. She does not have a sickly appearance. She does not appear ill.   HENT:   Head: Normocephalic and atraumatic.   Right Ear: External ear normal.   Left Ear: External ear normal.   Nose: Nose normal.   Eyes: Conjunctivae are normal. No scleral icterus.   Neck: Neck supple. No JVD present.   Normal range of motion.  Cardiovascular:  Normal rate, regular rhythm, normal heart sounds and intact distal pulses.     Exam reveals no gallop and no friction rub.       No murmur heard.  Pulmonary/Chest: Breath sounds normal. No stridor. No respiratory distress.   Abdominal: Abdomen is soft. She exhibits no distension. There is no abdominal tenderness. There is no rebound and no guarding.   Musculoskeletal:         General: No tenderness or edema. Normal range of motion.      Cervical back: Normal range of motion and neck supple.     Neurological: She is alert and oriented to person, place, and time. She has normal strength. She displays no atrophy and no tremor. No cranial nerve deficit or sensory deficit. She exhibits normal muscle tone. She displays no seizure activity. Gait normal. GCS score is 15. GCS eye subscore is 4. GCS verbal subscore is 5. GCS motor subscore is 6.   Moves all extremities, follows all commands, no focal neurologic deficits.    Skin: Skin is warm and dry. No rash noted. No erythema.   No jaundice   Psychiatric: She has a  normal mood and affect. Thought content normal.         ED Course   Procedures  Labs Reviewed   POCT URINALYSIS W/O SCOPE - Abnormal       Result Value    Glucose, UA Negative      Bilirubin, UA Negative      Ketones, UA Trace (*)     Spec Grav UA 1.025      Blood, UA Trace-intact (*)     PH, UA 6.0      Protein, UA 1+ (*)     Urobilinogen, UA 0.2      Nitrite, UA Positive (*)     Leukocytes, UA Trace (*)     Color, UA POC Yellow      Clarity, UA, POC Clear     POCT RAPID INFLUENZA A/B - Abnormal    Influenza B Ag negative      Inflenza A Ag positive (*)    CULTURE, URINE   POCT URINE PREGNANCY    POC Preg Test, Ur Negative       Acceptable Yes     SARS-COV-2 RDRP GENE    POC Rapid COVID Negative       Acceptable Yes     POCT STREP A MOLECULAR   POCT INFLUENZA A/B MOLECULAR   POCT URINALYSIS W/O SCOPE   POCT STREP A, RAPID    POC Rapid Strep A negative            Imaging Results    None          Medications   ketorolac injection 15 mg (15 mg Intramuscular Given 12/20/24 0821)   acetaminophen tablet 650 mg (650 mg Oral Given 12/20/24 0822)     Medical Decision Making   MDM  This is an emergent evaluation of a 19 y.o. female with no PMHx, presents to the ED for evaluation of dry cough, symptoms onset x 3 days. Initial vitals in the ED  [12/20/24 0706]  BP: 105/70  Pulse: 78  Resp: 14  Temp: 98.6 °F (37 °C)  SpO2: 98 % .     Physical exam noted above. DDx includes but is not limited to COVID versus influenza versus other viral illness, electrolyte abnormality, dehydration, UTI, SILVIA. Also considered but clinically less likely to be sepsis, stroke, bowel obstruction, ectopic pregnancy. Will obtain labs and imaging including COVID, influenza, UPT, UA. Will also provide Tylenol for pain. Will continue to monitor and frequently reassess pending results of labs, treatments and final disposition.    Patient is aware of plan and is amenable.     Jael Quispe D.O  EMERGENCY MEDICINE  8:17  AM 12/20/2024    UPDATE  Labs reveal patient influenza A positive.  UA consistent with a UTI with positive nitrites and trace leukocyte esterase.  On reassessment, patient is still having minimal pain.  She was given Toradol and on reassessment her symptoms were improved.  Vitals are reassuring.  Workup consistent with UTI and influenza.  Will discharge with Macrobid for UTI as well as Tamiflu and ibuprofen for flu.  She was also given PCP follow-up in ED return precautions.  Patient is aware and agreeable to plan.    Jael Quispe D.O  EMERGENCY MEDICINE   9:31 AM 12/20/2024       This chart was completed using dictation software, as a result there may be some transcription errors      Amount and/or Complexity of Data Reviewed  External Data Reviewed: labs and notes.  Labs: ordered. Decision-making details documented in ED Course.    Risk  OTC drugs.  Prescription drug management.            Scribe Attestation:   Scribe #1: I performed the above scribed service and the documentation accurately describes the services I performed. I attest to the accuracy of the note.                               Clinical Impression:  Final diagnoses:  [J10.1] Influenza A (Primary)  [N39.0, R31.9] Urinary tract infection with hematuria, site unspecified          ED Disposition Condition    Discharge Stable          I, Jael Quispe, DO, personally performed the services described in this documentation. All medical record entries made by the scribe were at my direction and in my presence. I have reviewed the chart and agree that the record reflects my personal performance and is accurate and complete.      DISCLAIMER: This note was prepared with [employer] voice recognition transcription software. Garbled syntax, mangled pronouns, and other bizarre constructions may be attributed to that software system.  ED Prescriptions       Medication Sig Dispense Start Date End Date Auth. Provider    oseltamivir (TAMIFLU) 75 MG  capsule Take 1 capsule (75 mg total) by mouth 2 (two) times daily. for 5 days 10 capsule 12/20/2024 12/25/2024 Jael Quispe,     ibuprofen (ADVIL,MOTRIN) 600 MG tablet Take 1 tablet (600 mg total) by mouth every 6 (six) hours as needed for Pain or Temperature greater than. 20 tablet 12/20/2024 -- Jael Quispe DO    nitrofurantoin, macrocrystal-monohydrate, (MACROBID) 100 MG capsule Take 1 capsule (100 mg total) by mouth 2 (two) times daily. for 7 days 14 capsule 12/20/2024 12/27/2024 Jael Quispe,           Follow-up Information       Follow up With Specialties Details Why Contact Info    Petra Spaulding MD Pediatrics Schedule an appointment as soon as possible for a visit in 1 week Emergency Room Follow-up [address] [facility] [address]  [phone]      [facility] ED Emergency Medicine Go to  If symptoms worsen [address] [facility] [id number] [phone]             Jael Quispe DO  01/08/25 3984     standing/actual

## 2024-12-20 NOTE — Clinical Note
"Laith"Harry Sewell was seen and treated in our emergency department on 12/20/2024.  She may return to work on 12/23/2024.       If you have any questions or concerns, please don't hesitate to call.      Jael Quispe, DO"

## 2024-12-20 NOTE — DISCHARGE INSTRUCTIONS
Thank you for coming to our Emergency Department today. It is important to remember that some problems or medical conditions are difficult to diagnose and may not be found during your Emergency Department visit.     Be sure to follow up with your primary care doctor and review all labs/imaging/tests that were performed during your ER visit with them. Some labs/tests may be outside of the normal range and require non-emergent follow-up and further investigation to help diagnose/exclude/prevent complications or other potentially serious medical conditions that were not addressed during your ER visit.    If you do not have a primary care doctor, you may contact the one listed on your discharge paperwork or you may also call the [facility] Appointment Desk at [phone] to schedule an appointment and establish care with one. It is important to your health that you have a primary care doctor.    Please take all medications as directed. All medications may potentially have side-effects and it is impossible to predict which medications may give you side-effects or what side-effects (if any) they will give you.. If you feel that you are having a negative effect or side-effect of any medication you should immediately stop taking them and seek medical attention. If you feel that you are having a life-threatening reaction call 911.    Return to the ER with any questions/concerns, new/concerning symptoms, worsening or failure to improve.     Do not drive, swim, climb to height, take a bath, operate heavy machinery, drink alcohol or take potentially sedating medications, sign any legal documents or make any important decisions for 24 hours if you have received any pain medications, sedatives or mood altering drugs during your ER visit or within 24 hours of taking them if they have been prescribed to you.     You can find additional resources for Dentists, hearing aids, durable medical equipment, low cost pharmacies and  other resources at [email or web address]    BELOW THIS LINE ONLY APPLIES IF YOU HAVE A COVID TEST PENDING OR IF YOU HAVE BEEN DIAGNOSED WITH COVID:  Please access [facility] to review the results of your test. Until the results of your COVID test return, you should isolate yourself so as not to potentially spread illness to others.   If your COVID test returns positive, you should isolate yourself so as not to spread illness to others. After five full days, if you are feeling better and you have not had fever for 24 hours, you can return to your typical daily activities, but you must wear a mask around others for an additional 5 days.   If your COVID test returns negative and you are either unvaccinated or more than six months out from your two-dose vaccine and are not yet boosted, you should still quarantine for 5 full days followed by strict mask use for an additional 5 full days.   If your COVID test returns negative and you have received your 2-dose initial vaccine as well as a booster, you should continue strict mask use for 10 full days after the exposure.  For all those exposed, best practice includes a test at day 5 after the exposure. This can be a home test or a test through one of the many testing centers throughout our community.   Masking is always advised to limit the spread of COVID. Cdc.gov is an excellent site to obtain the latest up to date recommendations regarding COVID and COVID testing.     CDC Testing and Quarantine Guidelines for patients with exposure to a known-positive COVID-19 person:  A close exposure is defined as anyone who has had an exposure (masked or unmasked) to a known COVID -19 positive person within 6 feet of someone for a cumulative total of 15 minutes or more over a 24-hour period.   Vaccinated and/or if you recently had a positive covid test within 90 days do NOT need to quarantine after contact with someone who had COVID-19 unless you develop symptoms.   Fully vaccinated  people who have not had a positive test within 90 days, should get tested 3-5 days after their exposure, even if they don't have symptoms and wear a mask indoors in public for 14 days following exposure or until their test result is negative.      Unvaccinated and/or NOT had a positive test within 90 days and meet close exposure  You are required by CDC guidelines to quarantine for at least 5 days from time of exposure followed by 5 days of strict masking. It is recommended, but not required to test after 5 days, unless you develop symptoms, in which case you should test at that time.  If you get tested after 5 days and your test is positive, your 5 day period of isolation starts the day of the positive test.    If your exposure does not meet the above definition, you can return to your normal daily activities to include social distancing, wearing a mask and frequent handwashing.      Here is a link to guidance from the CDC:  https://www.cdc.gov/media/releases/2021/s1227-isolation-quarantine-guidance.html      [place] Dept Of Health Testing Sites:  https://ldh.la.gov/page/3934      [facility] website with testing locations and guidance:  [email or web address]

## 2024-12-22 LAB — BACTERIA UR CULT: ABNORMAL

## 2025-04-03 ENCOUNTER — HOSPITAL ENCOUNTER (EMERGENCY)
Facility: HOSPITAL | Age: 20
Discharge: HOME | End: 2025-04-03
Attending: EMERGENCY MEDICINE
Payer: MEDICAID

## 2025-04-03 VITALS
BODY MASS INDEX: 25.18 KG/M2 | DIASTOLIC BLOOD PRESSURE: 76 MMHG | OXYGEN SATURATION: 100 % | TEMPERATURE: 98 F | WEIGHT: 170 LBS | HEIGHT: 69 IN | RESPIRATION RATE: 20 BRPM | HEART RATE: 68 BPM | SYSTOLIC BLOOD PRESSURE: 123 MMHG

## 2025-04-03 DIAGNOSIS — Z34.90 EARLY STAGE OF PREGNANCY: Primary | ICD-10-CM

## 2025-04-03 DIAGNOSIS — N39.0 ACUTE URINARY TRACT INFECTION: ICD-10-CM

## 2025-04-03 LAB
B-HCG UR QL: POSITIVE
BILIRUBIN, POC UA: NEGATIVE
BLOOD, POC UA: NEGATIVE
CLARITY, UA: CLEAR
COLOR, UA: YELLOW
CTP QC/QA: YES
GLUCOSE, POC UA: NEGATIVE
KETONES, POC UA: ABNORMAL
LEUKOCYTE EST, POC UA: NEGATIVE
NITRITE, POC UA: POSITIVE
PH UR STRIP: 5.5 [PH] (ref 5–8)
PROTEIN, POC UA: NEGATIVE
SPECIFIC GRAVITY, POC UA: >=1.03 (ref 1–1.03)
UROBILINOGEN, POC UA: 0.2 E.U./DL

## 2025-04-03 PROCEDURE — 87186 SC STD MICRODIL/AGAR DIL: CPT | Performed by: EMERGENCY MEDICINE

## 2025-04-03 PROCEDURE — 99284 EMERGENCY DEPT VISIT MOD MDM: CPT | Mod: ER

## 2025-04-03 PROCEDURE — 81025 URINE PREGNANCY TEST: CPT | Mod: ER | Performed by: EMERGENCY MEDICINE

## 2025-04-03 RX ORDER — ONDANSETRON 8 MG/1
8 TABLET, ORALLY DISINTEGRATING ORAL EVERY 8 HOURS PRN
Qty: 15 TABLET | Refills: 0 | Status: SHIPPED | OUTPATIENT
Start: 2025-04-03 | End: 2025-04-08

## 2025-04-03 RX ORDER — CEPHALEXIN 500 MG/1
500 CAPSULE ORAL 4 TIMES DAILY
Qty: 20 CAPSULE | Refills: 0 | Status: SHIPPED | OUTPATIENT
Start: 2025-04-03 | End: 2025-04-08

## 2025-04-03 RX ORDER — FAMOTIDINE 20 MG
1 TABLET ORAL DAILY
Qty: 60 TABLET | Refills: 0 | Status: SHIPPED | OUTPATIENT
Start: 2025-04-03 | End: 2026-04-03

## 2025-04-03 RX ORDER — ACETAMINOPHEN 500 MG
1000 TABLET ORAL EVERY 6 HOURS PRN
Qty: 30 TABLET | Refills: 0 | Status: SHIPPED | OUTPATIENT
Start: 2025-04-03

## 2025-04-03 RX ORDER — PROMETHAZINE HYDROCHLORIDE 25 MG/1
25 SUPPOSITORY RECTAL EVERY 6 HOURS PRN
Qty: 10 SUPPOSITORY | Refills: 0 | Status: SHIPPED | OUTPATIENT
Start: 2025-04-03

## 2025-04-03 NOTE — ED TRIAGE NOTES
Laith Sewell, a 20 y.o. female presents to the ED w/ complaint of being concerned for pregnancy after two home pregnancy tests that were +.  She denies any pain, bleeding or nausea and vomiting but does report some breast tenderness.      Triage note:  Chief Complaint   Patient presents with    Possible Pregnancy     Pt requesting pregnancy test, 2+ home test, denies bleeding     Review of patient's allergies indicates:  No Known Allergies  No past medical history on file.

## 2025-04-03 NOTE — ED PROVIDER NOTES
Encounter Date: 4/3/2025    SCRIBE #1 NOTE: I, Rito Cummings Do, am scribing for, and in the presence of,  Lorenza Easley DO. I have scribed the following portions of the note - Other sections scribed: HPI, ROS, PE, MDM.       History     Chief Complaint   Patient presents with    Possible Pregnancy     Pt requesting pregnancy test, 2+ home test, denies bleeding     20 y.o. female with no pertinent PMHx, who presents to the ED for chief complaint of pregnancy concerns. Patient reports mild breast tenderness. She reports normally having irregular menstrual cycles, states she is on Depo-Provera. She reports her LMP was 02/01/2025. Patient reports missing her menstrual cycle the following month. Patient reports having concerns following drinking alcohol about 5-6 days ago. She reports a positive home UPT yesterday and today. She denies previous pregnancies. She denies any current known medical problems. Patient denies any current prenatal vitamins. No other exacerbating or alleviating factors. Patient denies any nausea, emesis, diarrhea, abdominal pain, or other associated symptoms.     The history is provided by the patient. No  was used.     Review of patient's allergies indicates:  No Known Allergies  No past medical history on file.  No past surgical history on file.  No family history on file.  Social History[1]  Review of Systems   Constitutional:  Negative for fever.   HENT:  Negative for rhinorrhea and sore throat.    Eyes:  Negative for redness.   Respiratory:  Negative for shortness of breath.    Cardiovascular:  Negative for chest pain and leg swelling.   Gastrointestinal:  Negative for abdominal pain, diarrhea, nausea and vomiting.   Musculoskeletal:  Negative for back pain.        (+) Breast pain   Skin:  Negative for rash.   Neurological:  Negative for syncope and headaches.   All other systems reviewed and are negative.      Physical Exam     Initial Vitals [04/03/25 1123]   BP Pulse Resp  Temp SpO2   123/76 68 20 98.4 °F (36.9 °C) 100 %      MAP       --         Patient gave consent to have physical exam performed.    Physical Exam    Constitutional: Vital signs are normal. She appears well-developed and well-nourished.   HENT:   Head: Normocephalic and atraumatic.   Right Ear: External ear normal.   Left Ear: External ear normal.   Nose: Nose normal. Mouth/Throat: Oropharynx is clear and moist.   Eyes: Conjunctivae are normal.   Neck: Neck supple.   Normal range of motion.  Cardiovascular:  Normal rate, regular rhythm and normal heart sounds.     Exam reveals no gallop and no friction rub.       No murmur heard.  Pulmonary/Chest: Breath sounds normal. No respiratory distress. She has no wheezes. She has no rhonchi. She has no rales.   Musculoskeletal:      Cervical back: Normal range of motion and neck supple.     Neurological: She is alert and oriented to person, place, and time.   Skin: Skin is warm and dry. Capillary refill takes less than 2 seconds. No rash noted.   Psychiatric: She has a normal mood and affect.         ED Course   Procedures  Labs Reviewed   POCT URINE PREGNANCY - Abnormal       Result Value    POC Preg Test, Ur Positive (*)      Acceptable Yes     POCT URINALYSIS W/O SCOPE - Abnormal    Glucose, UA Negative      Bilirubin, UA Negative      Ketones, UA 2+ (*)     Spec Grav UA >=1.030 (*)     Blood, UA Negative      PH, UA 5.5      Protein, UA Negative      Urobilinogen, UA 0.2      Nitrite, UA Positive (*)     Leukocytes, UA Negative      Color, UA POC Yellow      Clarity, UA, POC Clear     CULTURE, URINE   POCT URINALYSIS W/O SCOPE          Imaging Results    None          Medications - No data to display  Medical Decision Making  Amount and/or Complexity of Data Reviewed  Labs: ordered. Decision-making details documented in ED Course.    Risk  OTC drugs.  Prescription drug management.    Medical Decision Making:    This is an evaluation of a 20 y.o. female that  presents to the Emergency Department for   Chief Complaint   Patient presents with    Possible Pregnancy     Pt requesting pregnancy test, 2+ home test, denies bleeding     The patient is a non-toxic and well appearing patient. On physical exam, patient appears well hydrated with moist mucus membranes. Breath sounds are clear and equal bilaterally with no adventitious breath sounds, tachypnea or respiratory distress. Regular rate and rhythm. No murmurs. Abdomen soft and non tender. Patient is tolerating PO without difficulty. Physical exam otherwise as above.     I have reviewed vital signs and nursing notes.   Vital Signs Are Reassuring.     Based on the patient's symptoms, I am considering and evaluating for the following differential diagnoses: amenorrhea, pregnancy, UTI.     ED Course:Treatment in the ED included Physical Exam and medications given in ED  Medications - No data to display.   Patient reports feeling better after treatment in the ER.   Vital signs reviewed  Nurse's notes reviewed  External Data/Documents Reviewed: Previous medical records and vital signs reviewed, see HPI and Physical exam.   Labs: ordered and reviewed.  Pregnancy test positive.  UA noticeable for nitrites.  Urine culture pending.  Ambulatory referral placed to OBGYN     RiskDiagnosis or treatment significantly limited by the following social determinants of health: Body mass index is 25.1 kg/m².     In shared decision making with the patient, we discussed treatment, prescriptions, labs, and imaging results.    Discharge home with   ED Prescriptions       Medication Sig Dispense Start Date End Date Auth. Provider    prenatal 21-iron fu-folic acid (PRENATAL COMPLETE) 14 mg iron- 400 mcg Tab Take 1 tablet by mouth once daily. 60 tablet 4/3/2025 4/3/2026 Lorenza Easley DO    ondansetron (ZOFRAN-ODT) 8 MG TbDL Take 1 tablet (8 mg total) by mouth every 8 (eight) hours as needed (As needed for nausea and vomiting). 15 tablet 4/3/2025  4/8/2025 Lorenza Easley DO    promethazine (PHENERGAN) 25 MG suppository Place 1 suppository (25 mg total) rectally every 6 (six) hours as needed (Use if  nausea not controlled with oral medication). 10 suppository 4/3/2025 -- Lorenza Easley DO    doxylamine succinate 25 mg tablet Take 1 tablet (25 mg total) by mouth nightly as needed (Take nightly to prevent nausea). 30 tablet 4/3/2025 -- Lorenza Easley DO    acetaminophen (TYLENOL) 500 MG tablet Take 2 tablets (1,000 mg total) by mouth every 6 (six) hours as needed for Pain. 30 tablet 4/3/2025 -- Lorenza Easley DO    cephALEXin (KEFLEX) 500 MG capsule Take 1 capsule (500 mg total) by mouth 4 (four) times daily. for 5 days 20 capsule 4/3/2025 4/8/2025 Lorenza Easley DO          Fill and take prescriptions as directed.  Return to the ED if symptoms worsen or do not resolve.   Answered questions and discussed discharge plan.    Patient reports resolution of symptoms and is ready for discharge.  Follow up with PCP/specialist in 1 day    The following labs and imaging were reviewed:    Admission on 04/03/2025   Component Date Value Ref Range Status    POC Preg Test, Ur 04/03/2025 Positive (A)  Negative Final     Acceptable 04/03/2025 Yes   Final    Glucose, UA 04/03/2025 Negative  Negative Final    Bilirubin, UA 04/03/2025 Negative  Negative Final    Ketones, UA 04/03/2025 2+ (A)  Negative Final    Spec Grav UA 04/03/2025 >=1.030 (>)  1.005 - 1.030 Final    Blood, UA 04/03/2025 Negative  Negative Final    PH, UA 04/03/2025 5.5  5.0 - 8.0 Final    Protein, UA 04/03/2025 Negative  Negative Final    Urobilinogen, UA 04/03/2025 0.2  <=1.0 E.U./dL Final    Nitrite, UA 04/03/2025 Positive (P)  Negative Final    Leukocytes, UA 04/03/2025 Negative  Negative Final    Color, UA POC 04/03/2025 Yellow  Yellow, Straw, Otilia Final    Clarity, UA, POC 04/03/2025 Clear  Clear Final        Imaging Results    None               Scribe Attestation:   Scribe #1: I performed the  above scribed service and the documentation accurately describes the services I performed. I attest to the accuracy of the note.                            I, Dr. Lorenza Easley, personally performed the services described in this documentation. This document was produced by a scribe under my direction and in my presence. All medical record entries made by the scribe were at my direction and in my presence.  I have reviewed the chart and agree that the record reflects my personal performance and is accurate and complete. Lorenza Easley DO.     04/03/2025 12:08 PM      Clinical Impression:  Final diagnoses:  [Z34.90] Early stage of pregnancy (Primary)  [N39.0] Acute urinary tract infection          ED Disposition Condition    Discharge Stable          ED Prescriptions       Medication Sig Dispense Start Date End Date Auth. Provider    prenatal 21-iron fu-folic acid (PRENATAL COMPLETE) 14 mg iron- 400 mcg Tab Take 1 tablet by mouth once daily. 60 tablet 4/3/2025 4/3/2026 Lorenza Easley DO    ondansetron (ZOFRAN-ODT) 8 MG TbDL Take 1 tablet (8 mg total) by mouth every 8 (eight) hours as needed (As needed for nausea and vomiting). 15 tablet 4/3/2025 4/8/2025 Lorenza Easley DO    promethazine (PHENERGAN) 25 MG suppository Place 1 suppository (25 mg total) rectally every 6 (six) hours as needed (Use if  nausea not controlled with oral medication). 10 suppository 4/3/2025 -- Lorenza Easley DO    doxylamine succinate 25 mg tablet Take 1 tablet (25 mg total) by mouth nightly as needed (Take nightly to prevent nausea). 30 tablet 4/3/2025 -- Lorenza Easley DO    acetaminophen (TYLENOL) 500 MG tablet Take 2 tablets (1,000 mg total) by mouth every 6 (six) hours as needed for Pain. 30 tablet 4/3/2025 -- Lorenza Easley DO    cephALEXin (KEFLEX) 500 MG capsule Take 1 capsule (500 mg total) by mouth 4 (four) times daily. for 5 days 20 capsule 4/3/2025 4/8/2025 Lorenza Easley DO          Follow-up Information       Follow up With  Specialties Details Why Contact Info Additional Information    [facility] - OB GYN Obstetrics and Gynecology Schedule an appointment as soon as possible for a visit in 1 day For further care and evaluation of pelvic pain and cramping [address] [facility] [address] [place] [id number] [phone] Please park in garage or Medical Ofc Bldg surface lot and use Medical Office Bldg elevator. If you are here for a visit with your OB/GYN, please check in on the 3rd floor, [address].  If you are here for Fetal Monitoring with the nurse, please check in on the 2nd Floor, [address].    [facility] Emergency Dept Emergency Medicine Go to  Please go to [facility] emergency department if symptoms worsen [address]  [facility] [place] [address] [phone]                  [1]   Social History  Tobacco Use    Smoking status: Never    Smokeless tobacco: Never   Substance Use Topics    Alcohol use: Yes     Comment: OCC    Drug use: Yes     Types: Marijuana        Lorenza Easley DO  04/03/25 [id number]

## 2025-04-03 NOTE — Clinical Note
"Laith"Laith" Melodie was seen and treated in our emergency department on 4/3/2025.  She may return to work on 04/05/2025.       If you have any questions or concerns, please don't hesitate to call.      Lorenza Easley, DO"

## 2025-04-04 ENCOUNTER — RESULTS FOLLOW-UP (OUTPATIENT)
Dept: EMERGENCY MEDICINE | Facility: HOSPITAL | Age: 20
End: 2025-04-04

## 2025-04-05 LAB — BACTERIA UR CULT: ABNORMAL

## 2025-04-08 ENCOUNTER — CLINICAL SUPPORT (OUTPATIENT)
Dept: OBSTETRICS AND GYNECOLOGY | Facility: CLINIC | Age: 20
End: 2025-04-08
Payer: MEDICAID

## 2025-04-08 ENCOUNTER — PATIENT MESSAGE (OUTPATIENT)
Dept: OBSTETRICS AND GYNECOLOGY | Facility: CLINIC | Age: 20
End: 2025-04-08

## 2025-04-08 DIAGNOSIS — N91.2 AMENORRHEA: Primary | ICD-10-CM

## 2025-04-08 DIAGNOSIS — O36.80X0 PREGNANCY WITH UNCERTAIN FETAL VIABILITY, SINGLE OR UNSPECIFIED FETUS: ICD-10-CM

## 2025-04-08 PROCEDURE — 99213 OFFICE O/P EST LOW 20 MIN: CPT | Mod: PBBFAC,TH

## 2025-04-08 PROCEDURE — 99999 PR PBB SHADOW E&M-EST. PATIENT-LVL III: CPT | Mod: PBBFAC,,,

## 2025-04-08 NOTE — PROGRESS NOTES
New pt with positive at home UPT. Based on LMP 2/6/25 OLGA is 11/13/25. Approximately 8w5d gestation. G1 with no prior Obstetric Hx. Pt reports some cramping and lower back pain but denies n/v or vaginal spotting/bleeding. Pt pregnancy was confirmed in ED. Pt currently taking Keflex for UTI.     During this visit, pt chart was reviewed and updated with patient including but not limited to demographics, Allergies, medications, pharmacy, medical/family history and OB history updated.     Pregnancy confirmation, initial OB and first routine OB appts scheduled.      Patient was guided through expectations of care during pregnancy and office visits.    Will need a need urine specimen - check for infection, glucose, protein  pap if due, swabs to test for infections-GC & CT, yeast  will order PN labs > bld type, CBC, check for infections  will discuss genetic [id number] done at 10 weeks gestation    Message was sent to [facility] staff to schedule and coordinating dating u/s.      arrive a few mins early  check in w/  reception  allowed 2 adult guests-NO CHILDREN  come with full bladder-vag u/s  no photos/videos  dating u/s performed at 8-10wks gestation      Discussed with pt and Provided Pregnancy Education    Mild cramping/spotting may be normal isabela in 1st tri & with sexual activity  encouraged to go to ED if saturating a pad with bright red blood and is associated with painful cramping/abd pain   Nausea/vomiting is normal during the 1st trimester  try eating small, frequent meals   Breast changes during early pregnancy  breasts may feel full/tender and may even leak  Diet  Healthy well-balanced diet including fruits, vegetable, protein, and carbs  No raw foods/sushi   Meat must be cooked well done  Avoid deli meats & hot dogs unless steaming hot prior to eating!  Wash fruits/veggies thoroughly.   Avoid soft non pasteurized cheeses > brie, Gouda, feta, blue cheese, queso  Avoid fish high in mercury > bigeye  tuna, harsha mackerel, marlin, orange roughy, shark, swordfish, tilefish  Limit canned tuna to 1x/wk  Fish lower in mercury > sardines, salmon, cod, catfish, tilapia, herring, trout, anchovies, sardines  Fluids   8-12 cups of water/day  Limit caffeine to <200mg/day (approx. one 12oz cup of coffee)   Vitamins  PNV with folic acid ~ (ex: One a Day Woman's PN Vit-gel capsule-no taste-helpful if have n/v)  Rest   Feeling tired is normal; Fadia during the 1st trimester  Take frequent breaks at home and at work.  Sleeping 8-12 hrs at night  Sleeping on your side rather than back or abdomen, especially as pregnancy progresses  Normal weight gain  1st trimester 5-7lbs or none at all  25-35 lbs is expected throughout the entire pregnancy  Exercise is ok to continue but don't start anything new or more strenuous   Alcohol, smoking, vaping, drugs, herbal supplements should be avoided.  Always check with your prescribing MD or OB/GYN before starting or stopping any medication  OTC Pregnancy Safe Medications will be sent via portal.   Avoid changing the cat litter box  Wear gloves with gardening   Important to wear seat belt in vehicle  making sure the bottom belt is tucked below the belly  Breastfeeding > benefits & recommendations  Begin to look for Pediatrician- baby's is to be seen within 2-3 days of d/c  Will need Infant car seat to bring baby home from hospital.     Advised Patient a message would be Sent to portal with information regarding the [email or web address] [email or web address] for access to the Pregnancy A to Z guide and Prenatal Class schedule. Informed of the ConnectedMOM program, Get Ready to Meet Your Baby pamphlet, Coffective karol and how to obtain a breast pump through insurance toward end of pregnancy as well as OTC pregnancy safe medications.     Education provided & questions answered. Pt verbalized understanding of prenatal education reviewed during this visit. All questions were answered and pt had no further questions.  30 minutes-Total time spent with pt during this visit.

## 2025-04-09 ENCOUNTER — INITIAL PRENATAL (OUTPATIENT)
Dept: OBSTETRICS AND GYNECOLOGY | Facility: CLINIC | Age: 20
End: 2025-04-09
Payer: MEDICAID

## 2025-04-09 VITALS
WEIGHT: 171.94 LBS | SYSTOLIC BLOOD PRESSURE: 112 MMHG | BODY MASS INDEX: 25.39 KG/M2 | DIASTOLIC BLOOD PRESSURE: 70 MMHG

## 2025-04-09 DIAGNOSIS — Z34.00 SUPERVISION OF NORMAL FIRST PREGNANCY, ANTEPARTUM: Primary | ICD-10-CM

## 2025-04-09 PROCEDURE — 81025 URINE PREGNANCY TEST: CPT | Mod: PBBFAC | Performed by: OBSTETRICS & GYNECOLOGY

## 2025-04-09 PROCEDURE — 99213 OFFICE O/P EST LOW 20 MIN: CPT | Mod: PBBFAC,TH | Performed by: OBSTETRICS & GYNECOLOGY

## 2025-04-09 PROCEDURE — 99999 PR PBB SHADOW E&M-EST. PATIENT-LVL III: CPT | Mod: PBBFAC,,, | Performed by: OBSTETRICS & GYNECOLOGY

## 2025-04-09 PROCEDURE — 99999PBSHW PR PBB SHADOW TECHNICAL ONLY FILED TO HB: Mod: PBBFAC,,,

## 2025-04-09 PROCEDURE — 99204 OFFICE O/P NEW MOD 45 MIN: CPT | Mod: S$PBB,TH,, | Performed by: OBSTETRICS & GYNECOLOGY

## 2025-04-09 NOTE — PROGRESS NOTES
Laith Sewell is a 20 y.o. , presents today for amenorrhea.    Has not seen any other provider for this possible pregnancy.     C/C: amenorrhea    HPI: Reports amenorrhea since Patient's last menstrual period was 2025 (approximate).. Prior to LMP, menses were irregular.  She is not currently on any contraception. + UPT on positive. She denies nausea and vomiting. Has noticed breast tenderness. Denies vaginal bleeding since LMP.    SOCIAL HISTORY: Denies emotional/mental/physical/sexual violence or abuse. Feels safe at home. Accompanied today by mother.  This is first baby for both of them.     PAP HISTORY:  She denies any history of abnormal pap smear or STDs.     She denies long-term chronic medical conditions.    Review of patient's allergies indicates:  No Known Allergies  Past Medical History:   Diagnosis Date    Sickle cell anemia      No past surgical history on file.  No past surgical history on file.  OB History    Para Term  AB Living   1        SAB IAB Ectopic Multiple Live Births             # Outcome Date GA Lbr Mart/2nd Weight Sex Type Anes PTL Lv   1 Current              OB History          1    Para        Term                AB        Living             SAB        IAB        Ectopic        Multiple        Live Births                   Social History[1]  Family History   Problem Relation Name Age of Onset    Diabetes Paternal Grandfather      No Known Problems Paternal Grandmother      Diabetes Maternal Grandmother      Diabetes Maternal Grandfather      Hypertension Mother      Diabetes Mother      Miscarriages / Stillbirths Sister       Social History     Substance and Sexual Activity   Sexual Activity Yes    Partners: Male    Birth control/protection: None       GENETIC SCREENING   Patient's age 35 years or older as of estimated date of delivery? no  Neural tube defect (meningomyelocele, spina bifida, or anencephaly)? no  Down syndrome? no  Ruddy-Sachs  (Ashkenazi Restorationist, Cajun, Bol[place]ian Tuvaluan)? no  Canavan disease (Ashkenazi Restorationist)? no  Familial dysautonomia (Ashkenazi Restorationist)? no  Sickle cell disease or trait ()? no  Hemophilia or other blood disorders? no  Cystic fibrosis? no  Muscular dystrophy? no  Oxford's chorea? no  Thalassemia (Italian, Greek, Mediterranean, or  background) MCV less than 80? no  Congenital heart defect? no  Mental retardation/autism? no   If Yes, was person tested for Fragile X? no  Other inherited genetic or chromosomal disorder? no  Maternal metabolic disorder (e.g. type 1 diabetes, PKU)? no  Patient or baby's father had a child with birth defects not listed above? no  Recurrent pregnancy loss or a stillbirth: no  Medications (including supplements, vitamins, herbs or OTC drugs)/illicit/recreational drugs/alcohol since last menstrual period? PNV    Petra Spaulding MD     ROS:  Constitutional/Gen: Denies fevers, chills, malaise, or weight loss. denies fatigue   Psych: Denies depression, anxiety  CV/vasc: Denies heart palpitations or edema  Resp: Denies SOB or dyspnea  Breasts: Denies mass, nipple discharge, or trauma. reports breast tenderness.  GI: Denies constipation, diarrhea, or vomiting. denies nausea.  : Denies vaginal discharge, dysuria or pelvic pain. reports urinary frequency      OBJECTIVE:  /70   Wt 78 kg (171 lb 15.3 oz)   LMP 02/06/2025 (Approximate)   BMI 25.39 kg/m²   Constitutional/Gen: NAD, appears stated age  Lung: normal resp effort, CTAB  Heart: normal HR, RRR   Back: negative CVAT  Abdomen: soft, nontender, no masses, and bowel sounds normal, no enlargement  External genitalia: no lesions or discharge, normal hair distribution  Vagina: normal appearance, no lesions, no discharge, no evidence cystocele or rectocele.  Cervix: normal appearance, no discharge, no lesions, negative CMT  Uterus: nontender, mobile, approx 8-9 week size, contour, and position  Extremities: FROM, with no  edema or tenderness.  Neurologic: A&O x 3  Psych: affect appropriate and without signs of mood, thought or memory difficulty appreciated      UPT positive in office    ASSESSMENT:  20 y.o. female  with amenorrhea  Likely at 8w6d via LMP  Body mass index is 25.39 kg/m².  Problem List[2]    PLAN:  1. Amenorrhea  -- + UPT in office, Patient's last menstrual period was 2025 (approximate). --> Estimated Date of Delivery: 25  -- Dating US ordered  -- Anatomical US 19-20w  -- Routine serum and urine prenatal labs today    2. Physical exam today  -- Discussed [facility] pap guidelines.   -- Pap (20 y.o.) sent today    3. BMI 25.39  -- Discussed IOM recommended weight gain of:   Underweight Less than 18.5 28-40    Normal Weight 18.5-24.9 25-35    Overweight 25-29.9  15-25    Obese   30 and greater 11-20   -- Discussed criteria for delivery at [facility] r/t excessive pre-preg weight or excessive weight gain:   Pre-pregnancy BMI over 40 or excess pregnancy weight gain defined as:   Pre-preg BMI < 18.5; Excess weight gain = > 60 pound   Pre-preg BMI 18.5-24.9;  Excess weight gain = > 53 pounds   Pre-preg BMI 25-29.9;  Excess weight gain = > 38 pounds   Pre-preg BMI > 30;  Excess weight gain = > 30 pounds    4. Discussed nausea and vomiting in pregnancy  -- Education regarding lifestyle and dietary modifications  -- Reviewed use of B6/Unisom prn. Pt will notify us if no relief/worsening symptoms, will consider alternative therapies prn    5. Pregnancy education and couseling; handouts and booklet provided  -- Oriented to practice and anticipated prenatal course of care and how to contact us  -- Precautions/warning signs reviewed  -- Common complaints of pregnancy  -- Routine prenatal labs including HIV  -- Ultrasounds  -- Nutrition, prepregnant BMI, and recommended weight gain  -- Toxoplasmosis precautions (Cats/Raw Meat)  -- Sexual activity and exercise  -- Environmental/Work hazards  -- Travel  -- Tobacco (Ask, Advise,  Assess, Assist, and Arrange), as well as alcohol and drug use  -- Use of any medications (Including supplements, Vitamins, Herbs, or OTC Drugs)  -- Domestic violence screen negative    6. Reviewed genetic testing options. Reviewed available first trimester and/or second  trimester screening options. Reviewed risk of false positive/negative results and recommendation of referral to MFM in event of a positive result, for NIPT, US, and/or amniocentesis. Reviewed the possible estimated 1 in 300/500 risk of miscarriage with amniocentesis, even with a healthy fetus. Patient desires genetic screening.     RTC x 4 weeks, call or present sooner prn.     Updated Medication List:  Current Medications[3]      Laith was seen today for initial prenatal visit.    Diagnoses and all orders for this visit:    Supervision of normal first pregnancy, antepartum  -     Treponema Pallidium Antibodies IgG, IgM; Future  -     Vaginosis Screen by DNA Probe  -     C. trachomatis/N. gonorrhoeae by AMP DNA  -     HIV 1/2 Ag/Ab (4th Gen); Future  -     Hemoglobin Electrophoresis,Hgb A2 Pj.; Future  -     Type & Screen - Ob Profile; Future  -     Rubella Antibody, IgG; Future  -     Hemoglobin A1C; Future  -     CBC Auto Differential; Future  -     US MFM Procedure ([facility])  -     Hepatitis Panel, Acute; Future  -     [id number] Plus W/ Sex Chromosome Abnormalities* T21, T18, T13 & Y + X; Future  -     Liquid-Based Pap Smear, Screening        Orders Placed This Encounter   Procedures    Treponema Pallidium Antibodies IgG, IgM    Vaginosis Screen by DNA Probe    C. trachomatis/N. gonorrhoeae by AMP DNA    HIV 1/2 Ag/Ab (4th Gen)    Hemoglobin Electrophoresis,Hgb A2 Pj.    Rubella Antibody, IgG    Hemoglobin A1C    CBC Auto Differential    Hepatitis Panel, Acute    [id number] Plus W/ Sex Chromosome Abnormalities* T21, T18, T13 & Y + X    Type & Screen - Ob Profile    US MFM Procedure ([facility])       Follow up in about 4 weeks (around  5/7/2025) for Routine OB.      Shekhar Altman MD  4/9/2025 4:49 PM             [1]   Social History  Socioeconomic History    Marital status: Single   Tobacco Use    Smoking status: Never    Smokeless tobacco: Never   Substance and Sexual Activity    Alcohol use: Not Currently     Comment: OCC    Drug use: Not Currently     Types: Marijuana    Sexual activity: Yes     Partners: Male     Birth control/protection: None     Social Drivers of Health     Financial Resource Strain: Low Risk  (4/3/2025)    Overall Financial Resource Strain (CARDIA)     Difficulty of Paying Living Expenses: Not hard at all   Food Insecurity: Food Insecurity Present (4/3/2025)    Hunger Vital Sign     Worried About Running Out of Food in the Last Year: Sometimes true     Ran Out of Food in the Last Year: Never true   Transportation Needs: No Transportation Needs (4/3/2025)    PRAPARE - Transportation     Lack of Transportation (Medical): No     Lack of Transportation (Non-Medical): No   Physical Activity: Unknown (4/3/2025)    Exercise Vital Sign     Days of Exercise per Week: 2 days   Stress: No Stress Concern Present (4/3/2025)    [employer] - Occupational Stress Questionnaire     Feeling of Stress : Only a little   Housing Stability: Low Risk  (4/3/2025)    Housing Stability Vital Sign     Unable to Pay for Housing in the Last Year: No     Homeless in the Last Year: No   [2] There is no problem list on file for this patient.  [3]   Current Outpatient Medications   Medication Sig Dispense Refill    acetaminophen (TYLENOL) 500 MG tablet Take 2 tablets (1,000 mg total) by mouth every 6 (six) hours as needed for Pain. 30 tablet 0    cetirizine (ZYRTEC) 10 MG tablet Take 1 tablet (10 mg total) by mouth once daily. 30 tablet 0    doxylamine succinate 25 mg tablet Take 1 tablet (25 mg total) by mouth nightly as needed (Take nightly to prevent nausea). 30 tablet 0    fluticasone propionate (FLONASE) 50  mcg/actuation nasal spray 1 spray (50 mcg total) by Each Nostril route 2 (two) times daily as needed for Rhinitis or Allergies. (Patient not taking: Reported on 4/8/2025) 15 g 0    mupirocin (BACTROBAN) 2 % ointment Apply topically 3 (three) times daily. (Patient not taking: Reported on 4/8/2025) 15 g 0    prenatal 21-iron fu-folic acid (PRENATAL COMPLETE) 14 mg iron- 400 mcg Tab Take 1 tablet by mouth once daily. 60 tablet 0    promethazine (PHENERGAN) 25 MG suppository Place 1 suppository (25 mg total) rectally every 6 (six) hours as needed (Use if  nausea not controlled with oral medication). (Patient not taking: Reported on 4/8/2025) 10 suppository 0    sodium chloride (OCEAN NASAL) 0.65 % nasal spray 1 spray by Nasal route every 3 (three) hours as needed for Congestion. (Patient not taking: Reported on 4/8/2025) 1 each 0     No current facility-administered medications for this visit.

## 2025-04-13 ENCOUNTER — HOSPITAL ENCOUNTER (EMERGENCY)
Facility: HOSPITAL | Age: 20
Discharge: HOME | End: 2025-04-13
Attending: EMERGENCY MEDICINE
Payer: MEDICAID

## 2025-04-13 VITALS
OXYGEN SATURATION: 98 % | RESPIRATION RATE: 18 BRPM | TEMPERATURE: 98 F | BODY MASS INDEX: 24.88 KG/M2 | DIASTOLIC BLOOD PRESSURE: 74 MMHG | HEIGHT: 69 IN | WEIGHT: 168 LBS | HEART RATE: 84 BPM | SYSTOLIC BLOOD PRESSURE: 128 MMHG

## 2025-04-13 DIAGNOSIS — N30.00 ACUTE CYSTITIS WITHOUT HEMATURIA: ICD-10-CM

## 2025-04-13 DIAGNOSIS — O20.0 THREATENED MISCARRIAGE: Primary | ICD-10-CM

## 2025-04-13 DIAGNOSIS — Z3A.01 6 WEEKS GESTATION OF PREGNANCY: ICD-10-CM

## 2025-04-13 LAB
ALBUMIN SERPL-MCNC: 4.1 G/DL (ref 3.3–5.5)
ALP SERPL-CCNC: 52 U/L (ref 42–141)
B-HCG UR QL: POSITIVE
BILIRUB SERPL-MCNC: 1.1 MG/DL (ref 0.2–1.6)
BILIRUBIN, POC UA: NEGATIVE
BLOOD, POC UA: NEGATIVE
BUN SERPL-MCNC: 8 MG/DL (ref 7–22)
CALCIUM SERPL-MCNC: 9.9 MG/DL (ref 8–10.3)
CHLORIDE SERPL-SCNC: 103 MMOL/L (ref 98–108)
CLARITY, UA: CLEAR
COLOR, UA: YELLOW
CREAT SERPL-MCNC: 0.6 MG/DL (ref 0.6–1.2)
CTP QC/QA: YES
GLUCOSE SERPL-MCNC: 87 MG/DL (ref 73–118)
GLUCOSE, POC UA: NEGATIVE
HCT, POC: NORMAL
HGB, POC: NORMAL (ref 14–18)
KETONES, POC UA: NEGATIVE
LEUKOCYTE EST, POC UA: NEGATIVE
MCH, POC: NORMAL
MCHC, POC: NORMAL
MCV, POC: NORMAL
MPV, POC: NORMAL
NITRITE, POC UA: POSITIVE
PH UR STRIP: 7 [PH] (ref 5–8)
POC ALT (SGPT): 8 U/L (ref 10–47)
POC AST (SGOT): 16 U/L (ref 11–38)
POC BETA-HCG (QUANT): >2000 IU/L
POC PLATELET COUNT: NORMAL
POC TCO2: 25 MMOL/L (ref 18–33)
POTASSIUM BLD-SCNC: 4.1 MMOL/L (ref 3.6–5.1)
PROTEIN, POC UA: ABNORMAL
PROTEIN, POC: 7.7 G/DL (ref 6.4–8.1)
RBC, POC: NORMAL
RDW, POC: NORMAL
RH BLD: NORMAL
SAMPLE: NORMAL
SODIUM BLD-SCNC: 133 MMOL/L (ref 128–145)
SPECIFIC GRAVITY, POC UA: 1.02 (ref 1–1.03)
UROBILINOGEN, POC UA: 0.2 E.U./DL
WBC, POC: NORMAL

## 2025-04-13 PROCEDURE — 99285 EMERGENCY DEPT VISIT HI MDM: CPT | Mod: 25,ER

## 2025-04-13 PROCEDURE — 96374 THER/PROPH/DIAG INJ IV PUSH: CPT | Mod: ER

## 2025-04-13 PROCEDURE — 86901 BLOOD TYPING SEROLOGIC RH(D): CPT | Performed by: NURSE PRACTITIONER

## 2025-04-13 PROCEDURE — 80053 COMPREHEN METABOLIC PANEL: CPT | Mod: ER

## 2025-04-13 PROCEDURE — 63600175 PHARM REV CODE 636 W HCPCS: Mod: ER | Performed by: NURSE PRACTITIONER

## 2025-04-13 PROCEDURE — 85025 COMPLETE CBC W/AUTO DIFF WBC: CPT | Mod: ER

## 2025-04-13 PROCEDURE — 81025 URINE PREGNANCY TEST: CPT | Mod: ER

## 2025-04-13 PROCEDURE — 87186 SC STD MICRODIL/AGAR DIL: CPT | Performed by: NURSE PRACTITIONER

## 2025-04-13 PROCEDURE — 81025 URINE PREGNANCY TEST: CPT | Mod: ER | Performed by: NURSE PRACTITIONER

## 2025-04-13 RX ORDER — CEFTRIAXONE 1 G/1
1 INJECTION, POWDER, FOR SOLUTION INTRAMUSCULAR; INTRAVENOUS
Status: COMPLETED | OUTPATIENT
Start: 2025-04-13 | End: 2025-04-13

## 2025-04-13 RX ORDER — CEPHALEXIN 500 MG/1
500 CAPSULE ORAL 4 TIMES DAILY
Qty: 28 CAPSULE | Refills: 0 | Status: SHIPPED | OUTPATIENT
Start: 2025-04-13 | End: 2025-04-20

## 2025-04-13 RX ADMIN — CEFTRIAXONE 1 G: 1 INJECTION, POWDER, FOR SOLUTION INTRAMUSCULAR; INTRAVENOUS at 05:04

## 2025-04-13 NOTE — ED PROVIDER NOTES
Encounter Date: 2025       History     Chief Complaint   Patient presents with    Vaginal Bleeding     A 21 y/o female presents to the ER c/o vaginal bleeding since this morning  Pt is 9 weeks and 3 days  Future OB/GYN appointment scheduled  +Light bleeding +Lower Back pain     CC:  Vaginal bleeding    HPI:  20-year-old female  approximately 9 weeks and 3 days pregnant (LMP 25) presenting to the ED for evaluation of vaginal bleeding.  She had episode of light vaginal bleeding 30 minutes prior to arrival while at work.  She noticed pink discharge on the toilet paper after using the bathroom.  Denies passing any tissue, clots, or bright red vaginal bleeding.  Reports mild low back pain.  She had an initial prenatal visit on  with Dr. Altman.  She has not had blood work or ultrasound yet.  Denies fever, chills, abdominal pain, weakness, urinary symptoms.     Patient was seen in the ED on 4/3 and diagnosed with UTI.  Prescribed cephalexin q.i.d. x5 days.  She has been taking medication twice daily.  Urine culture grew E coli sensitive to cephalexin.    The history is provided by the patient. No  was used.     Review of patient's allergies indicates:  No Known Allergies  Past Medical History:   Diagnosis Date    Sickle cell anemia      History reviewed. No pertinent surgical history.  Family History   Problem Relation Name Age of Onset    Diabetes Paternal Grandfather      No Known Problems Paternal Grandmother      Diabetes Maternal Grandmother      Diabetes Maternal Grandfather      Hypertension Mother      Diabetes Mother      Miscarriages / Stillbirths Sister       Social History[1]  Review of Systems   Constitutional:  Negative for chills and fever.   HENT:  Negative for sore throat.    Respiratory:  Negative for shortness of breath.    Cardiovascular:  Negative for chest pain.   Gastrointestinal:  Negative for abdominal pain, nausea and vomiting.   Genitourinary:  Positive for  vaginal bleeding. Negative for dysuria.   Musculoskeletal:  Negative for back pain.   Skin:  Negative for rash.   Neurological:  Negative for weakness.   Hematological:  Does not bruise/bleed easily.       Physical Exam     Initial Vitals [04/13/25 1624]   BP Pulse Resp Temp SpO2   112/79 81 18 98.8 °F (37.1 °C) 97 %      MAP       --         Physical Exam    Constitutional: She appears well-developed and well-nourished. She is not diaphoretic. No distress.   HENT:   Head: Normocephalic and atraumatic.   Neck:   Normal range of motion.  Cardiovascular:  Normal rate, regular rhythm, normal heart sounds and intact distal pulses.           Pulmonary/Chest: Breath sounds normal. No respiratory distress.   Abdominal: Abdomen is soft. Bowel sounds are normal. There is no abdominal tenderness.   Musculoskeletal:         General: Normal range of motion.      Cervical back: Normal range of motion.     Neurological: She is alert and oriented to person, place, and time.   Skin: Skin is warm and dry.   Psychiatric: She has a normal mood and affect. Her behavior is normal.         ED Course   Procedures  Labs Reviewed   CULTURE, URINE - Abnormal       Result Value    Urine Culture >100,000 cfu/ml Escherichia coli (*)    POCT URINE PREGNANCY - Abnormal    POC Preg Test, Ur Positive (*)      Acceptable Yes     POCT URINALYSIS W/O SCOPE - Abnormal    Glucose, UA Negative      Bilirubin, UA Negative      Ketones, UA Negative      Spec Grav UA 1.020      Blood, UA Negative      PH, UA 7.0      Protein, UA 1+ (*)     Urobilinogen, UA 0.2      Nitrite, UA Positive (*)     Leukocytes, UA Negative      Color, UA POC Yellow      Clarity, UA, POC Clear     POCT CMP - Abnormal    Albumin, POC 4.1      Alkaline Phosphatase, POC 52      ALT (SGPT), POC 8 (*)     AST (SGOT), POC 16      POC BUN 8      Calcium, POC 9.9      POC Chloride 103      POC Creatinine 0.6      POC Glucose 87      POC Potassium 4.1      POC Sodium 133       Bilirubin, POC 1.1      POC TCO2 25      Protein, POC 7.7     POCT CBC    Hematocrit        Hemoglobin        RBC        WBC        MCV        MCH, POC        MCHC        RDW-CV        Platelet Count, POC        MPV       GROUP & RH    Group & Rh O POS     POCT URINALYSIS W/O SCOPE   POCT CMP   POCT B-HCG (QUANT)   POCT B-HCG (QUANT)    POC Beta-HCG (Quant) >2000.0      Sample unknown            Imaging Results              US OB <14 Wks, TransAbd, Single Gestation (Final result)  Result time 04/13/25 18:03:54   Procedure changed from US OB <14 Wks TransAbd & TransVag, Single Gestation (XPD)     Final result by Geronimo Hameed MD (04/13/25 18:03:54)                   Impression:      Single living intrauterine gestation, crown-rump length correlates with an estimated gestational age of 6 weeks 6 days, cardiac activity documented at 124 beats per minute.      Electronically signed by: Geronimo Hameed MD  Date:    04/13/2025  Time:    18:03               Narrative:    EXAMINATION:  US OB <14 WEEKS TRANSABDOM, SINGLE GESTATION    CLINICAL HISTORY:  spotting;    TECHNIQUE:  Real-time sonography was performed the pelvis using transabdominal technique.    COMPARISON:  None    FINDINGS:  There is a single living intrauterine gestation, crown-rump length correlates with an estimated gestational age of 6 weeks 6 days, cardiac activity documented at 124 beats per minute.  The cervix is unremarkable.    The right ovary measures approximately 2.8 x 2.0 x 3.3 cm.  There is a corpus luteal cyst within the right ovary measuring approximately 1.4 x 1.8 x 2.4 cm.  The left ovary measures approximately 4.2 x 2.0 x 3.0 cm.  Arterial and venous flow is documented to the ovaries bilaterally.  No significant free fluid in the pelvis.                                       Medications   cefTRIAXone injection 1 g (1 g Intravenous Given 4/13/25 1750)     Medical Decision Making  20-year-old pregnant female presenting to the ED for  vaginal spotting.  Differentials include vaginal bleeding in early pregnancy, threatened , UTI, vaginitis, others.  On exam, patient is pleasant well-appearing.  Vital signs are stable and reassuring.  Abdominal exam is benign without tenderness, guarding, or rigidity.  UPT positive.  Beta-hCG greater than 2000.  Urinalysis with positive nitrite, negative leukocytes.  Patient has been taking cephalexin incorrectly, and tells me she has 1 or 2 tablets daily, and has a few doses of this medication left.  She denies any abdominal pain, urinary symptoms. No CVA tenderness or flank pain.  No leukocytosis on CBC   Previous urine culture grew E coli which was sensitive to cephalexin.  Repeat urine culture performed today.  Will give IV ceftriaxone and prescribe Keflex.  Single live IUP with estimated gestational age 6 weeks and 6 days.  Cardiac activity documented.  Follow up with OBGYN.    Based on my clinical evaluation, I do not appreciate any immediate, emergent, or life threatening condition or etiology that warrants additional workup today.  I feel the patient can be discharged with close follow-up care.           Amount and/or Complexity of Data Reviewed  Labs: ordered. Decision-making details documented in ED Course.  Radiology:  Decision-making details documented in ED Course.    Risk  Prescription drug management.               ED Course as of 25 1410   Sun 2025   1700 hCG Qualitative, Urine(!): Positive [MM]   1723 POC Beta-HCG (Quant): >2000.0 [MM]   1741 POCT CBC  WBC 4.5, hemoglobin 13.4, hematocrit 37.6 [MM]   1755 Group & Rh: O POS [MM]   1806 US OB <14 Wks, TransAbd, Single Gestation    Single living intrauterine gestation, crown-rump length correlates with an estimated gestational age of 6 weeks 6 days, cardiac activity documented at 124 beats per minute.    [MM]      ED Course User Index  [MM] Linda Lozano NP                           Clinical Impression:  Final diagnoses:  [O20.0]  Threatened miscarriage (Primary)  [Z3A.01] 6 weeks gestation of pregnancy  [N30.00] Acute cystitis without hematuria          ED Disposition Condition    Discharge Stable          ED Prescriptions       Medication Sig Dispense Start Date End Date Auth. Provider    cephALEXin (KEFLEX) 500 MG capsule Take 1 capsule (500 mg total) by mouth 4 (four) times daily. for 7 days 28 capsule 4/13/2025 4/20/2025 Linda Lozano NP          Follow-up Information       Follow up With Specialties Details Why Contact Info    Shekhar Castillo MD Obstetrics and Gynecology Schedule an appointment as soon as possible for a visit  For follow-up [address]  [address]  [facility] [address]  [phone]      [facility] ED Emergency Medicine Go to  If symptoms worsen [address] [facility] [address] [phone]                 Linda Lozano NP  04/13/25 7249       [1]   Social History  Tobacco Use    Smoking status: Never    Smokeless tobacco: Never   Substance Use Topics    Alcohol use: Not Currently     Comment: OCC    Drug use: Not Currently     Types: Marijuana        Linda Lozano NP  04/17/25 1410

## 2025-04-13 NOTE — Clinical Note
"Laith Davies" Melodie was seen and treated in our emergency department on 4/13/2025.  She may return to work on 04/15/2025.       If you have any questions or concerns, please don't hesitate to call.       RN    "

## 2025-04-13 NOTE — DISCHARGE INSTRUCTIONS
Your ultrasound shows you are 6 weeks and 6 days pregnant.    Thank you for coming to our Emergency Department today. It is important to remember that some problems or medical conditions are difficult to diagnose and may not be found during your Emergency Department visit.     Be sure to follow up with your primary care doctor and review all labs/imaging/tests that were performed during your ER visit with them. Some labs/tests may be outside of the normal range and require non-emergent follow-up and further investigation to help diagnose/exclude/prevent complications or other potentially serious medical conditions that were not addressed during your ER visit.    If you do not have a primary care doctor, you may contact the one listed on your discharge paperwork or you may also call the [facility] Appointment Desk at [phone] to schedule an appointment and establish care with one. It is important to your health that you have a primary care doctor.    Please take all medications as directed. All medications may potentially have side-effects and it is impossible to predict which medications may give you side-effects or what side-effects (if any) they will give you.. If you feel that you are having a negative effect or side-effect of any medication you should immediately stop taking them and seek medical attention. If you feel that you are having a life-threatening reaction call 911.    Return to the ER with any questions/concerns, new/concerning symptoms, worsening or failure to improve.     Do not drive, swim, climb to height, take a bath, operate heavy machinery, drink alcohol or take potentially sedating medications, sign any legal documents or make any important decisions for 24 hours if you have received any pain medications, sedatives or mood altering drugs during your ER visit or within 24 hours of taking them if they have been prescribed to you.     You can find additional resources for Dentists,  hearing aids, durable medical equipment, low cost pharmacies and other resources at https://[facility].org    BELOW THIS LINE ONLY APPLIES IF YOU HAVE A COVID TEST PENDING OR IF YOU HAVE BEEN DIAGNOSED WITH COVID:  Please access [facility] to review the results of your test. Until the results of your COVID test return, you should isolate yourself so as not to potentially spread illness to others.   If your COVID test returns positive, you should isolate yourself so as not to spread illness to others. After five full days, if you are feeling better and you have not had fever for 24 hours, you can return to your typical daily activities, but you must wear a mask around others for an additional 5 days.   If your COVID test returns negative and you are either unvaccinated or more than six months out from your two-dose vaccine and are not yet boosted, you should still quarantine for 5 full days followed by strict mask use for an additional 5 full days.   If your COVID test returns negative and you have received your 2-dose initial vaccine as well as a booster, you should continue strict mask use for 10 full days after the exposure.  For all those exposed, best practice includes a test at day 5 after the exposure. This can be a home test or a test through one of the many testing centers throughout our community.   Masking is always advised to limit the spread of COVID. Cdc.gov is an excellent site to obtain the latest up to date recommendations regarding COVID and COVID testing.     CDC Testing and Quarantine Guidelines for patients with exposure to a known-positive COVID-19 person:  A close exposure is defined as anyone who has had an exposure (masked or unmasked) to a known COVID -19 positive person within 6 feet of someone for a cumulative total of 15 minutes or more over a 24-hour period.   Vaccinated and/or if you recently had a positive covid test within 90 days do NOT need to quarantine after contact with someone  who had COVID-19 unless you develop symptoms.   Fully vaccinated people who have not had a positive test within 90 days, should get tested 3-5 days after their exposure, even if they don't have symptoms and wear a mask indoors in public for 14 days following exposure or until their test result is negative.      Unvaccinated and/or NOT had a positive test within 90 days and meet close exposure  You are required by CDC guidelines to quarantine for at least 5 days from time of exposure followed by 5 days of strict masking. It is recommended, but not required to test after 5 days, unless you develop symptoms, in which case you should test at that time.  If you get tested after 5 days and your test is positive, your 5 day period of isolation starts the day of the positive test.    If your exposure does not meet the above definition, you can return to your normal daily activities to include social distancing, wearing a mask and frequent handwashing.      Here is a link to guidance from the CDC:  https://www.cdc.gov/media/releases/2021/s1227-isolation-quarantine-guidance.html      [facility] Testing Sites:  https://ldh.la.gov/page/3934      [facility] website with testing locations and guidance:  [email or web address]

## 2025-04-15 ENCOUNTER — RESULTS FOLLOW-UP (OUTPATIENT)
Dept: OBSTETRICS AND GYNECOLOGY | Facility: HOSPITAL | Age: 20
End: 2025-04-15

## 2025-04-15 ENCOUNTER — RESULTS FOLLOW-UP (OUTPATIENT)
Dept: EMERGENCY MEDICINE | Facility: HOSPITAL | Age: 20
End: 2025-04-15

## 2025-04-15 DIAGNOSIS — B96.89 BV (BACTERIAL VAGINOSIS): Primary | ICD-10-CM

## 2025-04-15 DIAGNOSIS — B37.9 YEAST INFECTION: ICD-10-CM

## 2025-04-15 DIAGNOSIS — N76.0 BV (BACTERIAL VAGINOSIS): Primary | ICD-10-CM

## 2025-04-15 LAB — BACTERIA UR CULT: ABNORMAL

## 2025-04-15 RX ORDER — TERCONAZOLE 4 MG/G
1 CREAM VAGINAL NIGHTLY
Qty: 45 G | Refills: 0 | Status: SHIPPED | OUTPATIENT
Start: 2025-04-15 | End: 2025-04-22

## 2025-04-15 RX ORDER — METRONIDAZOLE 500 MG/1
500 TABLET ORAL EVERY 12 HOURS
Qty: 14 TABLET | Refills: 0 | Status: SHIPPED | OUTPATIENT
Start: 2025-04-15 | End: 2025-04-22

## 2025-04-22 ENCOUNTER — PROCEDURE VISIT (OUTPATIENT)
Dept: MATERNAL FETAL MEDICINE | Facility: CLINIC | Age: 20
End: 2025-04-22
Payer: MEDICAID

## 2025-04-22 DIAGNOSIS — O36.80X0 PREGNANCY WITH UNCERTAIN FETAL VIABILITY, SINGLE OR UNSPECIFIED FETUS: ICD-10-CM

## 2025-04-22 PROCEDURE — 76801 OB US < 14 WKS SINGLE FETUS: CPT | Mod: PBBFAC | Performed by: STUDENT IN AN ORGANIZED HEALTH CARE EDUCATION/TRAINING PROGRAM

## 2025-05-13 ENCOUNTER — ROUTINE PRENATAL (OUTPATIENT)
Dept: OBSTETRICS AND GYNECOLOGY | Facility: CLINIC | Age: 20
End: 2025-05-13
Payer: MEDICAID

## 2025-05-13 ENCOUNTER — PATIENT MESSAGE (OUTPATIENT)
Dept: OBSTETRICS AND GYNECOLOGY | Facility: CLINIC | Age: 20
End: 2025-05-13

## 2025-05-13 ENCOUNTER — PATIENT MESSAGE (OUTPATIENT)
Dept: ADMINISTRATIVE | Facility: OTHER | Age: 20
End: 2025-05-13
Payer: MEDICAID

## 2025-05-13 ENCOUNTER — PATIENT MESSAGE (OUTPATIENT)
Dept: MATERNAL FETAL MEDICINE | Facility: CLINIC | Age: 20
End: 2025-05-13
Payer: MEDICAID

## 2025-05-13 VITALS
SYSTOLIC BLOOD PRESSURE: 118 MMHG | BODY MASS INDEX: 25.72 KG/M2 | DIASTOLIC BLOOD PRESSURE: 70 MMHG | WEIGHT: 174.19 LBS

## 2025-05-13 DIAGNOSIS — Z34.00 SUPERVISION OF NORMAL FIRST PREGNANCY, ANTEPARTUM: Primary | ICD-10-CM

## 2025-05-13 DIAGNOSIS — D57.1 SICKLE CELL ANEMIA OF MOTHER DURING PREGNANCY: ICD-10-CM

## 2025-05-13 DIAGNOSIS — O99.019 SICKLE CELL ANEMIA OF MOTHER DURING PREGNANCY: ICD-10-CM

## 2025-05-13 PROCEDURE — 99999 PR PBB SHADOW E&M-EST. PATIENT-LVL III: CPT | Mod: PBBFAC,,, | Performed by: OBSTETRICS & GYNECOLOGY

## 2025-05-13 PROCEDURE — 99213 OFFICE O/P EST LOW 20 MIN: CPT | Mod: PBBFAC,TH | Performed by: OBSTETRICS & GYNECOLOGY

## 2025-05-16 ENCOUNTER — TELEPHONE (OUTPATIENT)
Dept: MATERNAL FETAL MEDICINE | Facility: CLINIC | Age: 20
End: 2025-05-16
Payer: MEDICAID

## 2025-05-16 DIAGNOSIS — Z36.89 ENCOUNTER FOR FETAL ANATOMIC SURVEY: Primary | ICD-10-CM

## 2025-05-16 DIAGNOSIS — Z36.89 ENCOUNTER FOR ULTRASOUND TO ASSESS FETAL GROWTH: ICD-10-CM

## 2025-05-16 NOTE — TELEPHONE ENCOUNTER
Call to patient and left voicemail. Patient called office right back. She was scheduled for her consult. Asked the patient who she was seeing for her sickle cell and she and her mom said that she only has the trait. The mom stated that she was tested as a baby and they were told that she has the trait. Asked the patient if she is in touch with the father of the baby, she said yes and that he does not carry the trait. Explained that she was scheduled for her appts, gave her date and time of appointments.

## 2025-05-25 ENCOUNTER — HOSPITAL ENCOUNTER (EMERGENCY)
Facility: HOSPITAL | Age: 20
Discharge: HOME | End: 2025-05-25
Attending: EMERGENCY MEDICINE
Payer: MEDICAID

## 2025-05-25 VITALS
SYSTOLIC BLOOD PRESSURE: 108 MMHG | DIASTOLIC BLOOD PRESSURE: 74 MMHG | BODY MASS INDEX: 25.76 KG/M2 | HEART RATE: 86 BPM | HEIGHT: 69 IN | RESPIRATION RATE: 18 BRPM | TEMPERATURE: 98 F | WEIGHT: 173.94 LBS | OXYGEN SATURATION: 98 %

## 2025-05-25 DIAGNOSIS — H60.502 ACUTE NONINFECTIVE OTITIS EXTERNA OF LEFT EAR, UNSPECIFIED TYPE: Primary | ICD-10-CM

## 2025-05-25 LAB
BILIRUBIN, POC UA: NEGATIVE
BLOOD, POC UA: ABNORMAL
CLARITY, UA: CLEAR
COLOR, UA: YELLOW
GLUCOSE, POC UA: NEGATIVE
KETONES, POC UA: ABNORMAL
LEUKOCYTE EST, POC UA: ABNORMAL
NITRITE, POC UA: POSITIVE
PH UR STRIP: 5.5 [PH] (ref 5–8)
PROTEIN, POC UA: NEGATIVE
SPECIFIC GRAVITY, POC UA: >=1.03 (ref 1–1.03)
UROBILINOGEN, POC UA: 0.2 E.U./DL

## 2025-05-25 PROCEDURE — 81003 URINALYSIS AUTO W/O SCOPE: CPT | Mod: ER

## 2025-05-25 PROCEDURE — 87088 URINE BACTERIA CULTURE: CPT | Performed by: NURSE PRACTITIONER

## 2025-05-25 PROCEDURE — 25000003 PHARM REV CODE 250: Mod: ER | Performed by: NURSE PRACTITIONER

## 2025-05-25 PROCEDURE — 99284 EMERGENCY DEPT VISIT MOD MDM: CPT | Mod: ER

## 2025-05-25 RX ORDER — AMOXICILLIN 250 MG/1
500 CAPSULE ORAL
Status: DISCONTINUED | OUTPATIENT
Start: 2025-05-25 | End: 2025-05-25

## 2025-05-25 RX ORDER — AMOXICILLIN AND CLAVULANATE POTASSIUM 875; 125 MG/1; MG/1
1 TABLET, FILM COATED ORAL
Status: DISCONTINUED | OUTPATIENT
Start: 2025-05-25 | End: 2025-05-25

## 2025-05-25 RX ORDER — CEPHALEXIN 500 MG/1
500 CAPSULE ORAL EVERY 6 HOURS
Qty: 40 CAPSULE | Refills: 0 | Status: SHIPPED | OUTPATIENT
Start: 2025-05-25 | End: 2025-06-04

## 2025-05-25 RX ORDER — AMOXICILLIN AND CLAVULANATE POTASSIUM 875; 125 MG/1; MG/1
1 TABLET, FILM COATED ORAL 2 TIMES DAILY
Qty: 14 TABLET | Refills: 0 | Status: SHIPPED | OUTPATIENT
Start: 2025-05-25 | End: 2025-05-25 | Stop reason: ALTCHOICE

## 2025-05-25 RX ORDER — CEPHALEXIN 500 MG/1
500 CAPSULE ORAL
Status: COMPLETED | OUTPATIENT
Start: 2025-05-25 | End: 2025-05-25

## 2025-05-25 RX ORDER — OFLOXACIN 3 MG/ML
3 SOLUTION AURICULAR (OTIC) 2 TIMES DAILY
Qty: 10 ML | Refills: 0 | Status: SHIPPED | OUTPATIENT
Start: 2025-05-25 | End: 2025-06-04

## 2025-05-25 RX ADMIN — CEPHALEXIN 500 MG: 500 CAPSULE ORAL at 01:05

## 2025-05-26 ENCOUNTER — RESULTS FOLLOW-UP (OUTPATIENT)
Dept: EMERGENCY MEDICINE | Facility: HOSPITAL | Age: 20
End: 2025-05-26

## 2025-05-27 LAB — BACTERIA UR CULT: ABNORMAL

## 2025-06-02 NOTE — PROGRESS NOTES
MATERNAL-FETAL MEDICINE   CONSULT NOTE    Provider requesting consultation: Dr. Castillo     SUBJECTIVE:     Ms. Laith Sewell is a 20 y.o.  female with IUP at 14w1d who is seen in consultation by MFM for  Reported sickle cell trait     Epic and Viewpoint notes reviewed          Medication List with Changes/Refills   Current Medications    ACETAMINOPHEN (TYLENOL) 500 MG TABLET    Take 2 tablets (1,000 mg total) by mouth every 6 (six) hours as needed for Pain.    CEPHALEXIN (KEFLEX) 500 MG CAPSULE    Take 1 capsule (500 mg total) by mouth every 6 (six) hours. for 10 days    CETIRIZINE (ZYRTEC) 10 MG TABLET    Take 1 tablet (10 mg total) by mouth once daily.    DOXYLAMINE SUCCINATE 25 MG TABLET    Take 1 tablet (25 mg total) by mouth nightly as needed (Take nightly to prevent nausea).    OFLOXACIN (FLOXIN) 0.3 % OTIC SOLUTION    Place 3 drops into the left ear 2 (two) times daily. for 10 days    PRENATAL 21-IRON FU-FOLIC ACID (PRENATAL COMPLETE) 14 MG IRON- 400 MCG TAB    Take 1 tablet by mouth once daily.       Review of patient's allergies indicates:  No Known Allergies    PMH:  Past Medical History:   Diagnosis Date    Sickle cell anemia        PObHx:  OB History    Para Term  AB Living   1        SAB IAB Ectopic Multiple Live Births             # Outcome Date GA Lbr Mart/2nd Weight Sex Type Anes PTL Lv   1 Current                PSH:No past surgical history on file.    Family history:family history includes Diabetes in her maternal grandfather, maternal grandmother, mother, and paternal grandfather; Hypertension in her mother; Miscarriages / Stillbirths in her sister; No Known Problems in her paternal grandmother.    Social history: reports that she has never smoked. She has never used smokeless tobacco. She reports that she does not currently use alcohol. She reports that she does not currently use drugs after having used the following drugs: Marijuana.      Objective:       Ultrasound  performed. See viewpoint for full ultrasound report.  1. 14w 1d gerber intrauterine pregnancy  2. Fetal biometry c/w dates   3. fetal cardiac activity documented   4. Amniotic fluid volume wnl per qualitative assessment     Significant labs/imaging:  Cell free DNA screen ordered but not pursued as of today's [facility] visit     ASSESSMENT/PLAN:     20 y.o.  female with IUP at 14w1d     Patient has not pursued hemoglobin electrophoresis for confirmation of diagnosis as of today    Patient reports she was told she has sickle cell trait.   FOB is unknown status     I counseled patient on recommendation for hemoglobin electrophoresis to confirm diagnosis. Patient was receptive     Today I counseled on sickle cell trait in case hemoglobin electrophoresis confirms:       Counseling   Sickle Cell Trait  Today I discussed with patient her SUSPECTED (not confirmed)  report of having  sickle cell trait. I reviewed with her the autosomal recessive nature of sickle cell disease and disease characteristics. I relayed to her the carrier rate of sickle cell trait in the -American population is 1 in 12. If both the patient and her partner are carriers, there is a 25% risk for the baby to have sickle cell disease. Prenatal diagnosis is available if paternal testing indicates carrier status. Patients with sickle cell trait have an increased risk of urinary tract infection during pregnancy and pyelonephritis.I counseled patient on recommended management during pregnancy including urine culture q trimester and if iron deficiency, supplementation recommended. Patient's questions were answered.     Recommendations:  -Recommend hemoglobin eletrophoresis for confirmation of patient's suspected sickle cell trait status         MFM staff will assist in pursuit     -recommend fetal anatomical survey and RTM MFM MD Visit at 19-20 wks gestation         MFM Staff will assist patient in scheduling appt       About 25   minutes of total  time spent on the encounter, which includes face to face time and non-face to face time preparing to see the patient (eg, review of tests), obtaining and/or reviewing separately obtained history, documenting clinical information in the electronic or other health record, independently interpreting results (not separately reported) and communicating results to the patient/family/caregiver, or care coordination (not separately reported).      Lorraine Casas  Maternal-Fetal Medicine    Electronically Signed by Lorraine Casas Anushka 3, 2025

## 2025-06-04 ENCOUNTER — PATIENT MESSAGE (OUTPATIENT)
Dept: MATERNAL FETAL MEDICINE | Facility: CLINIC | Age: 20
End: 2025-06-04

## 2025-06-04 ENCOUNTER — LAB VISIT (OUTPATIENT)
Dept: LAB | Facility: HOSPITAL | Age: 20
End: 2025-06-04
Attending: OBSTETRICS & GYNECOLOGY
Payer: MEDICAID

## 2025-06-04 ENCOUNTER — OFFICE VISIT (OUTPATIENT)
Dept: MATERNAL FETAL MEDICINE | Facility: CLINIC | Age: 20
End: 2025-06-04
Payer: MEDICAID

## 2025-06-04 ENCOUNTER — PROCEDURE VISIT (OUTPATIENT)
Dept: MATERNAL FETAL MEDICINE | Facility: CLINIC | Age: 20
End: 2025-06-04
Payer: MEDICAID

## 2025-06-04 VITALS
BODY MASS INDEX: 24.94 KG/M2 | DIASTOLIC BLOOD PRESSURE: 70 MMHG | SYSTOLIC BLOOD PRESSURE: 105 MMHG | WEIGHT: 168.88 LBS

## 2025-06-04 DIAGNOSIS — O99.019 SICKLE CELL ANEMIA OF MOTHER DURING PREGNANCY: ICD-10-CM

## 2025-06-04 DIAGNOSIS — D57.1 SICKLE CELL ANEMIA OF MOTHER DURING PREGNANCY: ICD-10-CM

## 2025-06-04 DIAGNOSIS — Z34.00 SUPERVISION OF NORMAL FIRST PREGNANCY, ANTEPARTUM: ICD-10-CM

## 2025-06-04 DIAGNOSIS — Z36.89 ENCOUNTER FOR FETAL ANATOMIC SURVEY: ICD-10-CM

## 2025-06-04 DIAGNOSIS — Z36.89 ENCOUNTER FOR ULTRASOUND TO ASSESS FETAL GROWTH: ICD-10-CM

## 2025-06-04 LAB
ABSOLUTE EOSINOPHIL (OHS): 0.03 K/UL
ABSOLUTE MONOCYTE (OHS): 0.34 K/UL (ref 0.3–1)
ABSOLUTE NEUTROPHIL COUNT (OHS): 1.67 K/UL (ref 1.8–7.7)
BASOPHILS # BLD AUTO: 0.02 K/UL
BASOPHILS NFR BLD AUTO: 0.5 %
EAG (OHS): 97 MG/DL (ref 68–131)
ERYTHROCYTE [DISTWIDTH] IN BLOOD BY AUTOMATED COUNT: 13.4 % (ref 11.5–14.5)
HAV IGM SERPL QL IA: NORMAL
HBA1C MFR BLD: 5 % (ref 4–5.6)
HBV CORE IGM SERPL QL IA: NORMAL
HBV SURFACE AG SERPL QL IA: NORMAL
HCT VFR BLD AUTO: 37.1 % (ref 37–48.5)
HCV AB SERPL QL IA: NORMAL
HGB BLD-MCNC: 12.4 GM/DL (ref 12–16)
HIV 1+2 AB+HIV1 P24 AG SERPL QL IA: NORMAL
IMM GRANULOCYTES # BLD AUTO: 0 K/UL (ref 0–0.04)
IMM GRANULOCYTES NFR BLD AUTO: 0 % (ref 0–0.5)
LYMPHOCYTES # BLD AUTO: 1.61 K/UL (ref 1–4.8)
MCH RBC QN AUTO: 30.6 PG (ref 27–31)
MCHC RBC AUTO-ENTMCNC: 33.4 G/DL (ref 32–36)
MCV RBC AUTO: 92 FL (ref 82–98)
NUCLEATED RBC (/100WBC) (OHS): 0 /100 WBC
PLATELET # BLD AUTO: 200 K/UL (ref 150–450)
PMV BLD AUTO: 11.7 FL (ref 9.2–12.9)
RBC # BLD AUTO: 4.05 M/UL (ref 4–5.4)
RELATIVE EOSINOPHIL (OHS): 0.8 %
RELATIVE LYMPHOCYTE (OHS): 43.9 % (ref 18–48)
RELATIVE MONOCYTE (OHS): 9.3 % (ref 4–15)
RELATIVE NEUTROPHIL (OHS): 45.5 % (ref 38–73)
T PALLIDUM IGG+IGM SER QL: NORMAL
WBC # BLD AUTO: 3.67 K/UL (ref 3.9–12.7)

## 2025-06-04 PROCEDURE — 36415 COLL VENOUS BLD VENIPUNCTURE: CPT

## 2025-06-04 PROCEDURE — 99999 PR PBB SHADOW E&M-EST. PATIENT-LVL III: CPT | Mod: PBBFAC,,, | Performed by: OBSTETRICS & GYNECOLOGY

## 2025-06-04 PROCEDURE — 76815 OB US LIMITED FETUS(S): CPT | Mod: PBBFAC | Performed by: OBSTETRICS & GYNECOLOGY

## 2025-06-04 PROCEDURE — 80074 ACUTE HEPATITIS PANEL: CPT

## 2025-06-04 PROCEDURE — 85025 COMPLETE CBC W/AUTO DIFF WBC: CPT

## 2025-06-04 PROCEDURE — 99202 OFFICE O/P NEW SF 15 MIN: CPT | Mod: S$PBB,TH,, | Performed by: OBSTETRICS & GYNECOLOGY

## 2025-06-04 PROCEDURE — 87389 HIV-1 AG W/HIV-1&-2 AB AG IA: CPT

## 2025-06-04 PROCEDURE — 3008F BODY MASS INDEX DOCD: CPT | Mod: CPTII,,, | Performed by: OBSTETRICS & GYNECOLOGY

## 2025-06-04 PROCEDURE — 3078F DIAST BP <80 MM HG: CPT | Mod: CPTII,,, | Performed by: OBSTETRICS & GYNECOLOGY

## 2025-06-04 PROCEDURE — 86762 RUBELLA ANTIBODY: CPT

## 2025-06-04 PROCEDURE — 76815 OB US LIMITED FETUS(S): CPT | Mod: 26,S$PBB,, | Performed by: OBSTETRICS & GYNECOLOGY

## 2025-06-04 PROCEDURE — 99213 OFFICE O/P EST LOW 20 MIN: CPT | Mod: PBBFAC,TH,25 | Performed by: OBSTETRICS & GYNECOLOGY

## 2025-06-04 PROCEDURE — 1159F MED LIST DOCD IN RCRD: CPT | Mod: CPTII,,, | Performed by: OBSTETRICS & GYNECOLOGY

## 2025-06-04 PROCEDURE — 3044F HG A1C LEVEL LT 7.0%: CPT | Mod: CPTII,,, | Performed by: OBSTETRICS & GYNECOLOGY

## 2025-06-04 PROCEDURE — 86593 SYPHILIS TEST NON-TREP QUANT: CPT

## 2025-06-04 PROCEDURE — 83021 HEMOGLOBIN CHROMOTOGRAPHY: CPT

## 2025-06-04 PROCEDURE — 3074F SYST BP LT 130 MM HG: CPT | Mod: CPTII,,, | Performed by: OBSTETRICS & GYNECOLOGY

## 2025-06-04 PROCEDURE — 83036 HEMOGLOBIN GLYCOSYLATED A1C: CPT

## 2025-06-05 LAB
HGB A2 MFR BLD HPLC: 3 % (ref 2.2–3.2)
HGB FRACT BLD ELPH-IMP: NORMAL
PATHOLOGIST INTERPRETATION - HGB SERUM (OHS): NORMAL
RUBV IGG SER-ACNC: 76.4 IU/ML
RUBV IGG SER-IMP: REACTIVE

## 2025-06-11 LAB
CFDNA.FET/CFDNA.TOTAL SFR FETUS: NORMAL %
CITATION REF LAB TEST: NORMAL
FET 13+18+21+X+Y ANEUP PLAS.CFDNA: NEGATIVE
FET CHR 21 TS PLAS.CFDNA QL: NEGATIVE
FET MS X RISK WBC.DNA+CFDNA QL: NOT DETECTED
FET SEX PLAS.CFDNA DOSAGE CFDNA: NORMAL
FET TS 13 RISK PLAS.CFDNA QL: NEGATIVE
FET TS 18 RISK WBC.DNA+CFDNA QL: NEGATIVE
FET X + Y ANEUP RISK PLAS.CFDNA SEQ-IMP: NOT DETECTED
GA EST FROM CONCEPTION DATE: NORMAL D
GESTATIONAL AGE > 9: YES
LAB DIRECTOR NAME PROVIDER: NORMAL
LAB DIRECTOR NAME PROVIDER: NORMAL
LABORATORY COMMENT REPORT: NORMAL
LIMITATIONS:: NORMAL
NEGATIVE PREDICTIVE VALUE: NORMAL
PERFORMANCE CHARACTERISTICS: NORMAL
POSITIVE PREDICTIVE VALUE: NORMAL
REF LAB TEST METHOD: NORMAL
SERVICE CMNT-IMP: NORMAL
TEST PERFORMANCE INFO SPEC: NORMAL

## 2025-06-13 NOTE — PROGRESS NOTES
reports that she is doing well with no complaints. She denies vaginal bleeding, LOF or contractions at this time.       /70   Wt 79 kg (174 lb 2.6 oz)   LMP 2025 (Approximate)   BMI 25.72 kg/m²     20 y.o., at 15w3d by Estimated Date of Delivery: 25  Problem List[1]  OB History    Para Term  AB Living   1        SAB IAB Ectopic Multiple Live Births             # Outcome Date GA Lbr Mart/2nd Weight Sex Type Anes PTL Lv   1 Current                Dating reviewed    Allergies and problem list reviewed and updated    Medical and surgical history reviewed    Prenatal labs reviewed and updated    Physical Exam:  ABD: soft, gravid, nontender    Assessment:  Laith was seen today for routine prenatal visit.    Diagnoses and all orders for this visit:    Supervision of normal first pregnancy, antepartum  -     Connected MOM Enrollment  -     Assign Connected MOM Program Consent Questionnaire    Sickle cell anemia of mother during pregnancy  -     Urine Culture High Risk  -     Ambulatory referral/consult to Perinatology; Future        Orders Placed This Encounter   Procedures    Urine Culture High Risk    Ambulatory referral/consult to Perinatology    Connected MOM Enrollment       Follow up in about 4 weeks (around 6/10/2025) for Routine OB.             [1] There is no problem list on file for this patient.

## 2025-06-17 ENCOUNTER — PATIENT MESSAGE (OUTPATIENT)
Dept: OTHER | Facility: OTHER | Age: 20
End: 2025-06-17
Payer: MEDICAID

## 2025-06-17 ENCOUNTER — ROUTINE PRENATAL (OUTPATIENT)
Dept: OBSTETRICS AND GYNECOLOGY | Facility: CLINIC | Age: 20
End: 2025-06-17
Payer: MEDICAID

## 2025-06-17 VITALS — SYSTOLIC BLOOD PRESSURE: 110 MMHG | DIASTOLIC BLOOD PRESSURE: 70 MMHG

## 2025-06-17 DIAGNOSIS — Z34.00 SUPERVISION OF NORMAL FIRST PREGNANCY, ANTEPARTUM: Primary | ICD-10-CM

## 2025-06-17 DIAGNOSIS — O23.40 UTI IN PREGNANCY, ANTEPARTUM: ICD-10-CM

## 2025-06-17 PROCEDURE — 99213 OFFICE O/P EST LOW 20 MIN: CPT | Mod: PBBFAC,TH | Performed by: OBSTETRICS & GYNECOLOGY

## 2025-06-17 PROCEDURE — 99999 PR PBB SHADOW E&M-EST. PATIENT-LVL III: CPT | Mod: PBBFAC,,, | Performed by: OBSTETRICS & GYNECOLOGY

## 2025-06-17 PROCEDURE — 87088 URINE BACTERIA CULTURE: CPT | Performed by: OBSTETRICS & GYNECOLOGY

## 2025-06-17 NOTE — PROGRESS NOTES
Ms. Sewell reports that she is doing well with no complaints. She denies vaginal bleeding, LOF or contractions at this time.       /70   LMP 2025 (Approximate)     20 y.o., at 16w0d by Estimated Date of Delivery: 25  Problem List[1]  OB History    Para Term  AB Living   1        SAB IAB Ectopic Multiple Live Births             # Outcome Date GA Lbr Mart/2nd Weight Sex Type Anes PTL Lv   1 Current                Dating reviewed    Allergies and problem list reviewed and updated    Medical and surgical history reviewed    Prenatal labs reviewed and updated    Physical Exam:  ABD: soft, gravid, nontender, 16 cm    Assessment:  Laith was seen today for routine prenatal visit.    Diagnoses and all orders for this visit:    Supervision of normal first pregnancy, antepartum  - Doing well    UTI in pregnancy, antepartum  -     Urine Culture High Risk ($$)        Orders Placed This Encounter   Procedures    Urine Culture High Risk ($$)       Follow up in about 4 weeks (around 7/15/2025) for Routine OB.             [1] There is no problem list on file for this patient.

## 2025-06-19 LAB — BACTERIA UR CULT: ABNORMAL

## 2025-06-20 ENCOUNTER — RESULTS FOLLOW-UP (OUTPATIENT)
Dept: OBSTETRICS AND GYNECOLOGY | Facility: HOSPITAL | Age: 20
End: 2025-06-20

## 2025-06-20 DIAGNOSIS — O23.40 URINARY TRACT INFECTION IN MOTHER DURING PREGNANCY, ANTEPARTUM: Primary | ICD-10-CM

## 2025-06-20 RX ORDER — CEPHALEXIN 500 MG/1
500 CAPSULE ORAL EVERY 12 HOURS
Qty: 14 CAPSULE | Refills: 0 | Status: SHIPPED | OUTPATIENT
Start: 2025-06-20 | End: 2025-06-27

## 2025-06-24 ENCOUNTER — PATIENT MESSAGE (OUTPATIENT)
Dept: OTHER | Facility: OTHER | Age: 20
End: 2025-06-24
Payer: MEDICAID

## 2025-06-26 ENCOUNTER — TELEPHONE (OUTPATIENT)
Dept: FAMILY MEDICINE | Facility: CLINIC | Age: 20
End: 2025-06-26
Payer: MEDICAID

## 2025-06-26 NOTE — TELEPHONE ENCOUNTER
Copied from CRM #[id number]. Topic: General Inquiry - Status Check  >> Jun 26, 2025  9:19 AM Yamileth wrote:  Type: Patient Call Back    Who called: self     What is the request in detail: pt stated se was told provider would fill out her [employer] paper work. Stated she as a set schedule and they are needing the paper work in order to accommodate her for the appointments she has scheduled on her non off days.   Pt stated they just sent her a msg stated they have not received any of the paper work. They are needing this paper work before her next appt. Stated she brought it up at her last visit and office told her they would check on it.   Phone number for [employer] is [phone]    Can the clinic reply by [facility]? no    Would the patient rather a call back or a response via [facility]? Call back     Best call back number: [phone]

## 2025-06-26 NOTE — TELEPHONE ENCOUNTER
Pt call was returned. Good morning.      We have have received your paperwork. Please Allow 7-10 business days for the completion of your paperwork. You will be notified once it is completed and a copy will be provided to you.   Pt will upload forms to portal.

## 2025-06-27 ENCOUNTER — PATIENT MESSAGE (OUTPATIENT)
Dept: OBSTETRICS AND GYNECOLOGY | Facility: CLINIC | Age: 20
End: 2025-06-27
Payer: MEDICAID

## 2025-07-02 ENCOUNTER — OFFICE VISIT (OUTPATIENT)
Dept: MATERNAL FETAL MEDICINE | Facility: CLINIC | Age: 20
End: 2025-07-02
Payer: MEDICAID

## 2025-07-02 ENCOUNTER — PROCEDURE VISIT (OUTPATIENT)
Dept: MATERNAL FETAL MEDICINE | Facility: CLINIC | Age: 20
End: 2025-07-02
Payer: MEDICAID

## 2025-07-02 VITALS — WEIGHT: 175.5 LBS | DIASTOLIC BLOOD PRESSURE: 70 MMHG | SYSTOLIC BLOOD PRESSURE: 110 MMHG | BODY MASS INDEX: 27.49 KG/M2

## 2025-07-02 DIAGNOSIS — Z36.89 ENCOUNTER FOR FETAL ANATOMIC SURVEY: Primary | ICD-10-CM

## 2025-07-02 DIAGNOSIS — Z36.89 ENCOUNTER FOR FETAL ANATOMIC SURVEY: ICD-10-CM

## 2025-07-02 DIAGNOSIS — Z83.2 FAMILY HISTORY OF SICKLE CELL TRAIT IN FATHER: Primary | ICD-10-CM

## 2025-07-02 PROCEDURE — 1160F RVW MEDS BY RX/DR IN RCRD: CPT | Mod: CPTII,,, | Performed by: STUDENT IN AN ORGANIZED HEALTH CARE EDUCATION/TRAINING PROGRAM

## 2025-07-02 PROCEDURE — 99999 PR PBB SHADOW E&M-EST. PATIENT-LVL III: CPT | Mod: PBBFAC,,, | Performed by: STUDENT IN AN ORGANIZED HEALTH CARE EDUCATION/TRAINING PROGRAM

## 2025-07-02 PROCEDURE — 3074F SYST BP LT 130 MM HG: CPT | Mod: CPTII,,, | Performed by: STUDENT IN AN ORGANIZED HEALTH CARE EDUCATION/TRAINING PROGRAM

## 2025-07-02 PROCEDURE — 76805 OB US >/= 14 WKS SNGL FETUS: CPT | Mod: PBBFAC | Performed by: STUDENT IN AN ORGANIZED HEALTH CARE EDUCATION/TRAINING PROGRAM

## 2025-07-02 PROCEDURE — 1159F MED LIST DOCD IN RCRD: CPT | Mod: CPTII,,, | Performed by: STUDENT IN AN ORGANIZED HEALTH CARE EDUCATION/TRAINING PROGRAM

## 2025-07-02 PROCEDURE — 99213 OFFICE O/P EST LOW 20 MIN: CPT | Mod: PBBFAC | Performed by: STUDENT IN AN ORGANIZED HEALTH CARE EDUCATION/TRAINING PROGRAM

## 2025-07-02 PROCEDURE — 99213 OFFICE O/P EST LOW 20 MIN: CPT | Mod: S$PBB,,, | Performed by: STUDENT IN AN ORGANIZED HEALTH CARE EDUCATION/TRAINING PROGRAM

## 2025-07-02 PROCEDURE — 3008F BODY MASS INDEX DOCD: CPT | Mod: CPTII,,, | Performed by: STUDENT IN AN ORGANIZED HEALTH CARE EDUCATION/TRAINING PROGRAM

## 2025-07-02 PROCEDURE — 3044F HG A1C LEVEL LT 7.0%: CPT | Mod: CPTII,,, | Performed by: STUDENT IN AN ORGANIZED HEALTH CARE EDUCATION/TRAINING PROGRAM

## 2025-07-02 PROCEDURE — 3078F DIAST BP <80 MM HG: CPT | Mod: CPTII,,, | Performed by: STUDENT IN AN ORGANIZED HEALTH CARE EDUCATION/TRAINING PROGRAM

## 2025-07-02 NOTE — PROGRESS NOTES
Maternal Fetal Medicine follow up consult    SUBJECTIVE:     Laith Sewell is a 20 y.o.  female with IUP at 18w1d who is seen in follow up consultation by M.  Pregnancy complications include:   Problem   Family History of Sickle Cell Trait in Father       Previous notes reviewed.   No changes to medical, surgical, family, social, or obstetric history.    Interval history since last M visit: Patient states she is feeling well. Denies contractions, vaginal bleeding, or leakage of fluid. She has no complaints or concerns today.    Medications reviewed.     OBJECTIVE:   /70 (BP Location: Left arm, Patient Position: Sitting)   Wt 79.6 kg (175 lb 7.8 oz)   LMP 2025 (Approximate)   BMI 27.49 kg/m²     Ultrasound performed. See viewpoint for full ultrasound report.  Anatomy complete    Significant labs/imaging:  Normal Hg electrophoresis as of 25    ASSESSMENT/PLAN:     20 y.o.  female with IUP at 18w1d    Assessment & Plan  Family history of sickle cell trait in father  Patient reported possible sickle cell trait, however normal Hg electrophoresis as of 25  Per patient, her father has sickle cell trait.  Please see prior consult note with Dr. Casas for additional details.    Patient does not have Sickle Cell trait. We reviewed these results.  PMHx/PSHx/OBGYN history reviewed, patient no longer needs [facility] care unless additional pregnancy complications arise.    FOLLOW UP  No additional follow up has been scheduled with [facility]. Please do not hesitate to contact us with any additional questions, and thank you for allowing us to participate in the care of this patient.    20 minutes of total time spent on the encounter, which includes face to face time and non-face to face time preparing to see the patient (eg, review of tests), obtaining and/or reviewing separately obtained history, documenting clinical information in the electronic or other health record, independently interpreting results (not  separately reported) and communicating results to the patient/family/caregiver, or care coordination (not separately reported).    NUBIA. Ghazal Serna MD   Maternal-Fetal Medicine

## 2025-07-02 NOTE — ASSESSMENT & PLAN NOTE
Patient reported possible sickle cell trait, however normal Hg electrophoresis as of 6/4/25  Per patient, her father has sickle cell trait.  Please see prior consult note with Dr. Casas for additional details.    Patient does not have Sickle Cell trait. We reviewed these results.  PMHx/PSHx/OBGYN history reviewed, patient no longer needs MFM care unless additional pregnancy complications arise.

## 2025-07-09 ENCOUNTER — TELEPHONE (OUTPATIENT)
Dept: OBSTETRICS AND GYNECOLOGY | Facility: CLINIC | Age: 20
End: 2025-07-09
Payer: MEDICAID

## 2025-07-14 ENCOUNTER — HOSPITAL ENCOUNTER (EMERGENCY)
Facility: HOSPITAL | Age: 20
Discharge: HOME | End: 2025-07-14
Attending: STUDENT IN AN ORGANIZED HEALTH CARE EDUCATION/TRAINING PROGRAM
Payer: MEDICAID

## 2025-07-14 VITALS
OXYGEN SATURATION: 100 % | DIASTOLIC BLOOD PRESSURE: 63 MMHG | RESPIRATION RATE: 18 BRPM | BODY MASS INDEX: 27.62 KG/M2 | HEIGHT: 67 IN | HEART RATE: 106 BPM | WEIGHT: 176 LBS | SYSTOLIC BLOOD PRESSURE: 107 MMHG | TEMPERATURE: 99 F

## 2025-07-14 DIAGNOSIS — N39.0 URINARY TRACT INFECTION WITHOUT HEMATURIA, SITE UNSPECIFIED: Primary | ICD-10-CM

## 2025-07-14 DIAGNOSIS — M54.50 LOW BACK PAIN DURING PREGNANCY IN SECOND TRIMESTER: ICD-10-CM

## 2025-07-14 DIAGNOSIS — O26.892 LOW BACK PAIN DURING PREGNANCY IN SECOND TRIMESTER: ICD-10-CM

## 2025-07-14 LAB
ALBUMIN SERPL-MCNC: 3.5 G/DL (ref 3.3–5.5)
ALP SERPL-CCNC: 61 U/L (ref 42–141)
BILIRUB SERPL-MCNC: 1.2 MG/DL (ref 0.2–1.6)
BILIRUBIN, POC UA: NEGATIVE
BLOOD, POC UA: ABNORMAL
BUN SERPL-MCNC: 5 MG/DL (ref 7–22)
CALCIUM SERPL-MCNC: 9.2 MG/DL (ref 8–10.3)
CHLORIDE SERPL-SCNC: 106 MMOL/L (ref 98–108)
CLARITY, UA: CLEAR
COLOR, UA: YELLOW
CREAT SERPL-MCNC: 0.6 MG/DL (ref 0.6–1.2)
GLUCOSE SERPL-MCNC: 81 MG/DL (ref 73–118)
GLUCOSE, POC UA: NEGATIVE
HCT, POC: NORMAL
HGB, POC: NORMAL (ref 14–18)
KETONES, POC UA: ABNORMAL
LEUKOCYTE EST, POC UA: ABNORMAL
MCH, POC: NORMAL
MCHC, POC: NORMAL
MCV, POC: NORMAL
MPV, POC: NORMAL
NITRITE, POC UA: POSITIVE
PH UR STRIP: 7.5 [PH] (ref 5–8)
POC ALT (SGPT): 21 U/L (ref 10–47)
POC AST (SGOT): 21 U/L (ref 11–38)
POC PLATELET COUNT: NORMAL
POC TCO2: 23 MMOL/L (ref 18–33)
POTASSIUM BLD-SCNC: 3.8 MMOL/L (ref 3.6–5.1)
PROTEIN, POC UA: NEGATIVE
PROTEIN, POC: 7.2 G/DL (ref 6.4–8.1)
RBC, POC: NORMAL
RDW, POC: NORMAL
SODIUM BLD-SCNC: 134 MMOL/L (ref 128–145)
SPECIFIC GRAVITY, POC UA: 1.01 (ref 1–1.03)
UROBILINOGEN, POC UA: 0.2 E.U./DL
WBC, POC: NORMAL

## 2025-07-14 PROCEDURE — 25000003 PHARM REV CODE 250: Mod: ER

## 2025-07-14 PROCEDURE — 96374 THER/PROPH/DIAG INJ IV PUSH: CPT | Mod: ER

## 2025-07-14 PROCEDURE — 99284 EMERGENCY DEPT VISIT MOD MDM: CPT | Mod: ER

## 2025-07-14 PROCEDURE — 63600175 PHARM REV CODE 636 W HCPCS: Mod: ER

## 2025-07-14 RX ORDER — CEPHALEXIN 500 MG/1
500 CAPSULE ORAL EVERY 12 HOURS
Qty: 10 CAPSULE | Refills: 0 | Status: SHIPPED | OUTPATIENT
Start: 2025-07-14 | End: 2025-07-19

## 2025-07-14 RX ORDER — CEFTRIAXONE 1 G/1
1 INJECTION, POWDER, FOR SOLUTION INTRAMUSCULAR; INTRAVENOUS
Status: COMPLETED | OUTPATIENT
Start: 2025-07-14 | End: 2025-07-14

## 2025-07-14 RX ORDER — ACETAMINOPHEN 500 MG
1000 TABLET ORAL
Status: COMPLETED | OUTPATIENT
Start: 2025-07-14 | End: 2025-07-14

## 2025-07-14 RX ADMIN — ACETAMINOPHEN 1000 MG: 500 TABLET ORAL at 08:07

## 2025-07-14 RX ADMIN — CEFTRIAXONE 1 G: 1 INJECTION, POWDER, FOR SOLUTION INTRAMUSCULAR; INTRAVENOUS at 08:07

## 2025-07-14 NOTE — Clinical Note
"Laith"Laith" Melodie was seen and treated in our emergency department on 7/14/2025.  She may return to work on 07/17/2025.       If you have any questions or concerns, please don't hesitate to call.      Zaida Rizo PA-C"

## 2025-07-15 ENCOUNTER — ROUTINE PRENATAL (OUTPATIENT)
Dept: OBSTETRICS AND GYNECOLOGY | Facility: CLINIC | Age: 20
End: 2025-07-15
Payer: MEDICAID

## 2025-07-15 ENCOUNTER — PATIENT MESSAGE (OUTPATIENT)
Dept: OTHER | Facility: OTHER | Age: 20
End: 2025-07-15
Payer: MEDICAID

## 2025-07-15 ENCOUNTER — TELEPHONE (OUTPATIENT)
Dept: OBSTETRICS AND GYNECOLOGY | Facility: CLINIC | Age: 20
End: 2025-07-15
Payer: MEDICAID

## 2025-07-15 VITALS — SYSTOLIC BLOOD PRESSURE: 108 MMHG | BODY MASS INDEX: 26.94 KG/M2 | DIASTOLIC BLOOD PRESSURE: 60 MMHG | WEIGHT: 172 LBS

## 2025-07-15 DIAGNOSIS — Z34.00 SUPERVISION OF NORMAL FIRST PREGNANCY, ANTEPARTUM: Primary | ICD-10-CM

## 2025-07-15 DIAGNOSIS — O23.40 UTI IN PREGNANCY, ANTEPARTUM: ICD-10-CM

## 2025-07-15 PROCEDURE — 87086 URINE CULTURE/COLONY COUNT: CPT | Performed by: OBSTETRICS & GYNECOLOGY

## 2025-07-15 PROCEDURE — 99999 PR PBB SHADOW E&M-EST. PATIENT-LVL III: CPT | Mod: PBBFAC,,, | Performed by: OBSTETRICS & GYNECOLOGY

## 2025-07-15 PROCEDURE — 99213 OFFICE O/P EST LOW 20 MIN: CPT | Mod: PBBFAC,TH | Performed by: OBSTETRICS & GYNECOLOGY

## 2025-07-15 NOTE — TELEPHONE ENCOUNTER
Copy of medical leave paperwork sent to pt via portal.     Copied from CRM #[id number]. Topic: General Inquiry - Return Call  >> Jul 7, 2025 10:30 AM Med Assistant Rojas wrote:  Type: Patient Call Back    Who called: Self    What is the request in detail: asking if her paperwork has been received and faxed off like requested..     Can the clinic reply by [facility]?NO    Would the patient rather a call back or a response via [facility]? Yes, call     Best call back number: [phone] (home)  >> Jul 7, 2025 11:52 AM Med Assistant Heidy wrote:    ----- Message -----  From: Rojas Hough MA  Sent: 7/7/2025  10:30 AM CDT  To: Debbie Corrigan Staff

## 2025-07-15 NOTE — ED PROVIDER NOTES
"Encounter Date: 7/14/2025    SCRIBE #1 NOTE: IRito Do, am scribing for, and in the presence of,  Zaida Rizo PA-C. I have scribed the following portions of the note - Other sections scribed: HPI, TANIKA.       History     Chief Complaint   Patient presents with    Back Pain     C/O BACK PAIN, PREGNANCY EMMA TOLD TO COME TO ER TO BE "CHECKED OUT" APPOINTMENT WITH OBGYN ANDREW     20-year-old female G1 approximately 20 weeks gestational age with a pertinent past medical history of sickle cell trait, who presents the ED with sharp bilateral lower back pain that radiates to her bilateral sides onset 2 days ago. Patient reports symptoms were worse this morning which prompted her to the ED. She states pain is exacerbated with moving her back. She reports working at a [employer]. Patient reports having an appointment with her OBGYN tomorrow. She denies any attempted treatment with Tylenol. No other exacerbating or alleviating factors. Patient denies any abdominal pain, nausea, emesis, dysuria, general body aches,  light-headedness, dizziness, vaginal discharge, bowel/bladder incontinence, fever, saddle anesthesias vaginal bleeding, or other associated symptoms.    Per chart review 07/02/2025 Transabdominal ultrasound examination  Carney IUP with cardiac activity is identified. The fetal anatomic survey was completed today and no fetal structural abnormalities were noted. Interval fetal growth has been normal (EFW 234g). MVP is normal.    The history is provided by the patient. No  was used.     Review of patient's allergies indicates:  No Known Allergies  Past Medical History:   Diagnosis Date    Sickle cell trait      History reviewed. No pertinent surgical history.  Family History   Problem Relation Name Age of Onset    Diabetes Paternal Grandfather      No Known Problems Paternal Grandmother      Diabetes Maternal Grandmother      Diabetes Maternal Grandfather      Hypertension " Mother      Diabetes Mother      Miscarriages / Stillbirths Sister       Social History[1]  Review of Systems   Constitutional:  Negative for chills and fever.   HENT:  Negative for congestion and sore throat.    Eyes:  Negative for visual disturbance.   Respiratory:  Negative for cough and shortness of breath.    Cardiovascular:  Negative for chest pain.   Gastrointestinal:  Negative for abdominal pain, nausea and vomiting.   Genitourinary:  Negative for dysuria, vaginal bleeding and vaginal discharge.   Musculoskeletal:  Positive for back pain. Negative for myalgias.   Skin:  Negative for rash.   Neurological:  Negative for dizziness, light-headedness and headaches.   Psychiatric/Behavioral:  Negative for decreased concentration.        Physical Exam     Initial Vitals [07/14/25 1925]   BP Pulse Resp Temp SpO2   115/85 91 18 99 °F (37.2 °C) 100 %      MAP       --         Physical Exam    Vitals reviewed.  Constitutional: She appears well-developed and well-nourished. No distress.   HENT:   Head: Normocephalic and atraumatic.   Eyes: Conjunctivae are normal.   Cardiovascular:  Normal rate, regular rhythm, normal heart sounds and intact distal pulses.           Pulmonary/Chest: Breath sounds normal. No respiratory distress.   Abdominal: Abdomen is soft. Bowel sounds are normal. She exhibits no distension. There is no abdominal tenderness.   No right CVA tenderness.  No left CVA tenderness. There is no rebound, no guarding, no tenderness at McBurney's point and negative Rosario's sign.   Musculoskeletal:         General: Normal range of motion.      Comments: No midline tenderness or step-offs appreciated.  Mild bilateral lumbar paraspinal muscle tenderness.  Patient has full range motion of lumbar spine without pain.  Full range motion of all extremities.  5/5 strength in extremities.  Sensation intact.  No saddle anesthesias.     Lymphadenopathy:     She has no cervical adenopathy.   Neurological: She is alert and  oriented to person, place, and time.   Skin: Skin is warm and dry.   Psychiatric: She has a normal mood and affect.         ED Course   Procedures  Labs Reviewed   POCT URINALYSIS W/O SCOPE - Abnormal       Result Value    Glucose, UA Negative      Bilirubin, UA Negative      Ketones, UA 4+ (*)     Spec Grav UA 1.015      Blood, UA Trace-intact (*)     PH, UA 7.5      Protein, UA Negative      Urobilinogen, UA 0.2      Nitrite, UA Positive (*)     Leukocytes, UA 1+ (*)     Color, UA POC Yellow      Clarity, UA, POC Clear     POCT CMP - Abnormal    Albumin, POC 3.5      Alkaline Phosphatase, POC 61      ALT (SGPT), POC 21      AST (SGOT), POC 21      POC BUN 5 (*)     Calcium, POC 9.2      POC Chloride 106      POC Creatinine 0.6      POC Glucose 81      POC Potassium 3.8      POC Sodium 134      Bilirubin, POC 1.2      POC TCO2 23      Protein, POC 7.2     POCT CBC    Hematocrit        Hemoglobin        RBC        WBC        MCV        MCH, POC        MCHC        RDW-CV        Platelet Count, POC        MPV       POCT URINALYSIS W/O SCOPE   POCT CMP          Imaging Results    None          Medications   acetaminophen tablet 1,000 mg (1,000 mg Oral Given 7/14/25 2009)   cefTRIAXone injection 1 g (1 g Intravenous Given 7/14/25 2023)     Medical Decision Making  This is an evaluation of a 20 y.o. female that presents to the Emergency Department for low back pain.  Patient is 20 weeks pregnant.  Patient denies any abdominal pain, vaginal discharge or vaginal bleeding The patient is a non-toxic, afebrile, and well appearing female. On physical exam, there is mild bilateral lumbar paraspinal muscle tenderness. There is no abdominal tenderness, distention, peritoneal signs, or CVA tenderness.     Vitals reassuring. RESULTS:  UA nitrite positive. Urine Culture is pending.  No leukocytosis or anemia noted.  No major electrolyte abnormality, SILVIA or transaminitis.  Fetal heart rate 170.    Given the above findings, my overall  impression is UTI and low back pain during pregnancy. Given the above findings, I do not think the patient has an appendicitis, bowel obstruction, pyelonephritis or STI, miscarriage, cholecystitis .    During her stay in the ED, the patient has been given Tylenol and 1 g of Rocephin with good relief of her symptoms. The patient will be discharged home with Keflex. Additional home care recommendations include taken Tylenol as needed for pain.  We discussed increasing oral hydration. The diagnosis, treatment plan, instructions for follow-up and reevaluation with her OBGYN/PCP as well as ED return precautions have been discussed with the patient and she has verbalized an understanding of the information. All questions or concerns from the patient have been addressed.     Amount and/or Complexity of Data Reviewed  External Data Reviewed: notes.     Details: See HPI.  Labs: ordered. Decision-making details documented in ED Course.    Risk  OTC drugs.  Prescription drug management.            Scribe Attestation:   Scribe #1: I performed the above scribed service and the documentation accurately describes the services I performed. I attest to the accuracy of the note.        ED Course as of 07/14/25 2115 Mon Jul 14, 2025 1930 BP: 115/85 [MB]   1930 Temp: 99 °F (37.2 °C) [MB]   1930 Pulse: 91 [MB]   1930 Resp: 18 [MB]   1930 SpO2: 100 % [MB]   1938 Nitrite, UA(!): Positive [MB]   2034 POCT CBC  No leukocytosis or anemia noted.  Platelet count within normal limits [MB]   2043 POCT CMP(!)  No transaminitis, SILVIA, electrolyte abnormality [MB]      ED Course User Index  [MB] Zaida Rizo PA-C                               Clinical Impression:  Final diagnoses:  [N39.0] Urinary tract infection without hematuria, site unspecified (Primary)  [O26.892, M54.50] Low back pain during pregnancy in second trimester       IZaida PA-C, personally performed the services described in this documentation.  All medical  record entries made by the scribe were at my direction and in my presence.  I have reviewed the chart and agree that the record reflects my personal performance and is accurate and complete.     ED Disposition Condition    Discharge Stable          ED Prescriptions       Medication Sig Dispense Start Date End Date Auth. Provider    cephALEXin (KEFLEX) 500 MG capsule Take 1 capsule (500 mg total) by mouth every 12 (twelve) hours. for 5 days 10 capsule 7/14/2025 7/19/2025 Zaida Rizo PA-C          Follow-up Information       Follow up With Specialties Details Why Contact Info    Petra Spaulding MD Pediatrics Schedule an appointment as soon as possible for a visit in 3 days for follow up [address] [facility] [address]  [phone]      [facility] ED Emergency Medicine Go to  If symptoms worsen, As needed, shortness of breath, chest pain, fever, worsening cough, nausea, vomiting, abdominal pain [address] [facility] [id number] [phone]                   [1]   Social History  Tobacco Use    Smoking status: Never    Smokeless tobacco: Never   Substance Use Topics    Alcohol use: Not Currently     Comment: OCC    Drug use: Not Currently     Types: Marijuana        Zaida Rizo PA-C  07/14/25 0693

## 2025-07-18 LAB — BACTERIA UR CULT: NORMAL

## 2025-08-07 NOTE — PROGRESS NOTES
Ms. Sewell reports that she is doing well. She does complain of having to void more often. Normal fetal movements with no vaginal bleeding, LOF or contractions at this time.     /60   Wt 78 kg (172 lb)   LMP 2025 (Approximate)   BMI 26.94 kg/m²     20 y.o., at 23w2d by Estimated Date of Delivery: 25  Problem List[1]  OB History    Para Term  AB Living   1        SAB IAB Ectopic Multiple Live Births             # Outcome Date GA Lbr Mart/2nd Weight Sex Type Anes PTL Lv   1 Current                Dating reviewed    Allergies and problem list reviewed and updated    Medical and surgical history reviewed    Prenatal labs reviewed and updated    Physical Exam:  ABD: soft, gravid, nontender,     Assessment:  Diagnoses and all orders for this visit:    Supervision of normal first pregnancy, antepartum  - Doing well    UTI in pregnancy, antepartum  -     Urine Culture High Risk ($$)        Orders Placed This Encounter   Procedures    Urine Culture High Risk ($$)       Follow up in about 4 weeks (around 2025) for Routine OB.           [1]   Patient Active Problem List  Diagnosis    Family history of sickle cell trait in father

## 2025-08-11 ENCOUNTER — TELEPHONE (OUTPATIENT)
Dept: OBSTETRICS AND GYNECOLOGY | Facility: CLINIC | Age: 20
End: 2025-08-11
Payer: MEDICAID

## 2025-08-12 ENCOUNTER — PATIENT MESSAGE (OUTPATIENT)
Dept: OTHER | Facility: OTHER | Age: 20
End: 2025-08-12
Payer: MEDICAID

## 2025-08-13 ENCOUNTER — ROUTINE PRENATAL (OUTPATIENT)
Dept: OBSTETRICS AND GYNECOLOGY | Facility: CLINIC | Age: 20
End: 2025-08-13
Payer: MEDICAID

## 2025-08-13 VITALS
WEIGHT: 180.75 LBS | BODY MASS INDEX: 28.31 KG/M2 | DIASTOLIC BLOOD PRESSURE: 80 MMHG | SYSTOLIC BLOOD PRESSURE: 112 MMHG

## 2025-08-13 DIAGNOSIS — Z34.00 SUPERVISION OF NORMAL FIRST PREGNANCY, ANTEPARTUM: Primary | ICD-10-CM

## 2025-08-13 PROCEDURE — 99213 OFFICE O/P EST LOW 20 MIN: CPT | Mod: PBBFAC,TH | Performed by: OBSTETRICS & GYNECOLOGY

## 2025-08-13 PROCEDURE — 99999 PR PBB SHADOW E&M-EST. PATIENT-LVL III: CPT | Mod: PBBFAC,,, | Performed by: OBSTETRICS & GYNECOLOGY

## 2025-08-13 PROCEDURE — 99213 OFFICE O/P EST LOW 20 MIN: CPT | Mod: TH,S$PBB,, | Performed by: OBSTETRICS & GYNECOLOGY

## 2025-08-26 ENCOUNTER — PATIENT MESSAGE (OUTPATIENT)
Dept: OTHER | Facility: OTHER | Age: 20
End: 2025-08-26
Payer: MEDICAID